# Patient Record
Sex: FEMALE | Race: WHITE | NOT HISPANIC OR LATINO | Employment: OTHER
[De-identification: names, ages, dates, MRNs, and addresses within clinical notes are randomized per-mention and may not be internally consistent; named-entity substitution may affect disease eponyms.]

---

## 2017-01-30 ENCOUNTER — RECORDS - HEALTHEAST (OUTPATIENT)
Dept: ADMINISTRATIVE | Facility: OTHER | Age: 62
End: 2017-01-30

## 2017-01-31 ENCOUNTER — RECORDS - HEALTHEAST (OUTPATIENT)
Dept: ADMINISTRATIVE | Facility: OTHER | Age: 62
End: 2017-01-31

## 2017-02-14 ENCOUNTER — RECORDS - HEALTHEAST (OUTPATIENT)
Dept: ADMINISTRATIVE | Facility: OTHER | Age: 62
End: 2017-02-14

## 2017-06-13 ENCOUNTER — COMMUNICATION - HEALTHEAST (OUTPATIENT)
Dept: SCHEDULING | Facility: CLINIC | Age: 62
End: 2017-06-13

## 2017-06-13 DIAGNOSIS — F34.1 DYSTHYMIC DISORDER: ICD-10-CM

## 2017-06-16 ENCOUNTER — COMMUNICATION - HEALTHEAST (OUTPATIENT)
Dept: FAMILY MEDICINE | Facility: CLINIC | Age: 62
End: 2017-06-16

## 2017-06-16 DIAGNOSIS — F34.1 DYSTHYMIC DISORDER: ICD-10-CM

## 2017-09-06 ENCOUNTER — COMMUNICATION - HEALTHEAST (OUTPATIENT)
Dept: SCHEDULING | Facility: CLINIC | Age: 62
End: 2017-09-06

## 2017-09-06 DIAGNOSIS — F34.1 DYSTHYMIC DISORDER: ICD-10-CM

## 2017-09-18 ENCOUNTER — OFFICE VISIT - HEALTHEAST (OUTPATIENT)
Dept: FAMILY MEDICINE | Facility: CLINIC | Age: 62
End: 2017-09-18

## 2017-09-18 DIAGNOSIS — A60.00 GENITAL HERPES SIMPLEX, UNSPECIFIED SITE: ICD-10-CM

## 2017-09-18 DIAGNOSIS — F34.1 DYSTHYMIC DISORDER: ICD-10-CM

## 2017-09-18 DIAGNOSIS — Z00.00 ROUTINE GENERAL MEDICAL EXAMINATION AT A HEALTH CARE FACILITY: ICD-10-CM

## 2017-09-18 DIAGNOSIS — M79.645 THUMB PAIN, LEFT: ICD-10-CM

## 2017-09-18 DIAGNOSIS — M89.9 DISORDER OF BONE AND CARTILAGE: ICD-10-CM

## 2017-09-18 DIAGNOSIS — E55.9 VITAMIN D DEFICIENCY: ICD-10-CM

## 2017-09-18 DIAGNOSIS — M25.561 KNEE PAIN, RIGHT: ICD-10-CM

## 2017-09-18 DIAGNOSIS — M94.9 DISORDER OF BONE AND CARTILAGE: ICD-10-CM

## 2017-09-18 DIAGNOSIS — A60.00 GENITAL HERPES, UNSPECIFIED: ICD-10-CM

## 2017-09-18 DIAGNOSIS — C44.90 MALIGNANT NEOPLASM OF SKIN: ICD-10-CM

## 2017-09-18 DIAGNOSIS — N95.2 POSTMENOPAUSAL ATROPHIC VAGINITIS: ICD-10-CM

## 2017-09-18 LAB
CHOLEST SERPL-MCNC: 249 MG/DL
FASTING STATUS PATIENT QL REPORTED: YES
HDLC SERPL-MCNC: 76 MG/DL
LDLC SERPL CALC-MCNC: 151 MG/DL
TRIGL SERPL-MCNC: 109 MG/DL

## 2017-09-18 ASSESSMENT — MIFFLIN-ST. JEOR: SCORE: 1231.02

## 2017-09-20 ENCOUNTER — COMMUNICATION - HEALTHEAST (OUTPATIENT)
Dept: FAMILY MEDICINE | Facility: CLINIC | Age: 62
End: 2017-09-20

## 2017-09-27 ENCOUNTER — AMBULATORY - HEALTHEAST (OUTPATIENT)
Dept: FAMILY MEDICINE | Facility: CLINIC | Age: 62
End: 2017-09-27

## 2017-09-27 DIAGNOSIS — E78.5 HYPERLIPIDEMIA, UNSPECIFIED HYPERLIPIDEMIA TYPE: ICD-10-CM

## 2017-09-28 ENCOUNTER — COMMUNICATION - HEALTHEAST (OUTPATIENT)
Dept: FAMILY MEDICINE | Facility: CLINIC | Age: 62
End: 2017-09-28

## 2017-09-28 ENCOUNTER — AMBULATORY - HEALTHEAST (OUTPATIENT)
Dept: NURSING | Facility: CLINIC | Age: 62
End: 2017-09-28

## 2017-09-28 ENCOUNTER — AMBULATORY - HEALTHEAST (OUTPATIENT)
Dept: FAMILY MEDICINE | Facility: CLINIC | Age: 62
End: 2017-09-28

## 2017-10-06 ENCOUNTER — OFFICE VISIT - HEALTHEAST (OUTPATIENT)
Dept: PHYSICAL THERAPY | Facility: REHABILITATION | Age: 62
End: 2017-10-06

## 2017-10-06 DIAGNOSIS — M25.561 ACUTE PAIN OF RIGHT KNEE: ICD-10-CM

## 2017-10-06 DIAGNOSIS — M62.81 GENERALIZED MUSCLE WEAKNESS: ICD-10-CM

## 2017-10-10 ENCOUNTER — OFFICE VISIT - HEALTHEAST (OUTPATIENT)
Dept: PHYSICAL THERAPY | Facility: REHABILITATION | Age: 62
End: 2017-10-10

## 2017-10-10 DIAGNOSIS — M62.81 GENERALIZED MUSCLE WEAKNESS: ICD-10-CM

## 2017-10-10 DIAGNOSIS — M25.561 ACUTE PAIN OF RIGHT KNEE: ICD-10-CM

## 2017-10-12 ENCOUNTER — OFFICE VISIT - HEALTHEAST (OUTPATIENT)
Dept: PHYSICAL THERAPY | Facility: REHABILITATION | Age: 62
End: 2017-10-12

## 2017-10-12 DIAGNOSIS — M62.81 GENERALIZED MUSCLE WEAKNESS: ICD-10-CM

## 2017-10-12 DIAGNOSIS — M25.561 ACUTE PAIN OF RIGHT KNEE: ICD-10-CM

## 2017-10-17 ENCOUNTER — OFFICE VISIT - HEALTHEAST (OUTPATIENT)
Dept: PHYSICAL THERAPY | Facility: REHABILITATION | Age: 62
End: 2017-10-17

## 2017-10-17 DIAGNOSIS — M25.561 ACUTE PAIN OF RIGHT KNEE: ICD-10-CM

## 2017-10-17 DIAGNOSIS — M62.81 GENERALIZED MUSCLE WEAKNESS: ICD-10-CM

## 2017-10-23 ENCOUNTER — OFFICE VISIT - HEALTHEAST (OUTPATIENT)
Dept: PHYSICAL THERAPY | Facility: REHABILITATION | Age: 62
End: 2017-10-23

## 2017-10-23 DIAGNOSIS — M62.81 GENERALIZED MUSCLE WEAKNESS: ICD-10-CM

## 2017-10-23 DIAGNOSIS — M25.561 ACUTE PAIN OF RIGHT KNEE: ICD-10-CM

## 2017-10-25 ENCOUNTER — OFFICE VISIT - HEALTHEAST (OUTPATIENT)
Dept: PHYSICAL THERAPY | Facility: REHABILITATION | Age: 62
End: 2017-10-25

## 2017-10-25 DIAGNOSIS — M25.561 ACUTE PAIN OF RIGHT KNEE: ICD-10-CM

## 2017-10-25 DIAGNOSIS — M62.81 GENERALIZED MUSCLE WEAKNESS: ICD-10-CM

## 2017-10-30 ENCOUNTER — COMMUNICATION - HEALTHEAST (OUTPATIENT)
Dept: FAMILY MEDICINE | Facility: CLINIC | Age: 62
End: 2017-10-30

## 2017-11-09 ENCOUNTER — OFFICE VISIT - HEALTHEAST (OUTPATIENT)
Dept: FAMILY MEDICINE | Facility: CLINIC | Age: 62
End: 2017-11-09

## 2017-11-09 DIAGNOSIS — M79.89 SWELLING OF LEFT THUMB: ICD-10-CM

## 2017-11-09 DIAGNOSIS — M25.461 SWELLING OF RIGHT KNEE JOINT: ICD-10-CM

## 2017-11-10 ENCOUNTER — HOSPITAL ENCOUNTER (OUTPATIENT)
Dept: MRI IMAGING | Facility: CLINIC | Age: 62
Discharge: HOME | End: 2017-11-10
Attending: FAMILY MEDICINE

## 2017-11-10 DIAGNOSIS — M25.461 SWELLING OF RIGHT KNEE JOINT: ICD-10-CM

## 2017-11-13 ENCOUNTER — COMMUNICATION - HEALTHEAST (OUTPATIENT)
Dept: FAMILY MEDICINE | Facility: CLINIC | Age: 62
End: 2017-11-13

## 2017-11-14 ENCOUNTER — RECORDS - HEALTHEAST (OUTPATIENT)
Dept: ADMINISTRATIVE | Facility: OTHER | Age: 62
End: 2017-11-14

## 2017-11-20 ENCOUNTER — HOSPITAL ENCOUNTER (OUTPATIENT)
Dept: NUTRITION | Facility: HOSPITAL | Age: 62
Discharge: HOME | End: 2017-11-20
Attending: FAMILY MEDICINE

## 2017-11-20 DIAGNOSIS — E78.5 HYPERLIPIDEMIA, UNSPECIFIED HYPERLIPIDEMIA TYPE: ICD-10-CM

## 2018-10-03 ENCOUNTER — COMMUNICATION - HEALTHEAST (OUTPATIENT)
Dept: FAMILY MEDICINE | Facility: CLINIC | Age: 63
End: 2018-10-03

## 2018-10-03 DIAGNOSIS — F34.1 DYSTHYMIC DISORDER: ICD-10-CM

## 2018-10-03 DIAGNOSIS — A60.00 GENITAL HERPES: ICD-10-CM

## 2018-10-10 ENCOUNTER — COMMUNICATION - HEALTHEAST (OUTPATIENT)
Dept: FAMILY MEDICINE | Facility: CLINIC | Age: 63
End: 2018-10-10

## 2018-10-10 DIAGNOSIS — B00.9 HERPES SIMPLEX TYPE II INFECTION: ICD-10-CM

## 2018-10-10 DIAGNOSIS — F34.1 DYSTHYMIC DISORDER: ICD-10-CM

## 2018-11-05 ENCOUNTER — OFFICE VISIT - HEALTHEAST (OUTPATIENT)
Dept: FAMILY MEDICINE | Facility: CLINIC | Age: 63
End: 2018-11-05

## 2018-11-05 DIAGNOSIS — F34.1 DYSTHYMIC DISORDER: ICD-10-CM

## 2018-11-05 DIAGNOSIS — B00.9 HERPES SIMPLEX TYPE II INFECTION: ICD-10-CM

## 2018-11-05 DIAGNOSIS — Z12.31 VISIT FOR SCREENING MAMMOGRAM: ICD-10-CM

## 2018-11-05 DIAGNOSIS — A60.00 GENITAL HERPES SIMPLEX, UNSPECIFIED SITE: ICD-10-CM

## 2018-12-21 ENCOUNTER — RECORDS - HEALTHEAST (OUTPATIENT)
Dept: MAMMOGRAPHY | Facility: CLINIC | Age: 63
End: 2018-12-21

## 2018-12-21 DIAGNOSIS — Z12.31 ENCOUNTER FOR SCREENING MAMMOGRAM FOR MALIGNANT NEOPLASM OF BREAST: ICD-10-CM

## 2019-10-03 ENCOUNTER — COMMUNICATION - HEALTHEAST (OUTPATIENT)
Dept: FAMILY MEDICINE | Facility: CLINIC | Age: 64
End: 2019-10-03

## 2019-10-14 ENCOUNTER — COMMUNICATION - HEALTHEAST (OUTPATIENT)
Dept: FAMILY MEDICINE | Facility: CLINIC | Age: 64
End: 2019-10-14

## 2019-10-14 DIAGNOSIS — B00.9 HERPES SIMPLEX TYPE II INFECTION: ICD-10-CM

## 2019-10-16 ENCOUNTER — COMMUNICATION - HEALTHEAST (OUTPATIENT)
Dept: FAMILY MEDICINE | Facility: CLINIC | Age: 64
End: 2019-10-16

## 2019-10-16 DIAGNOSIS — B00.9 HERPES SIMPLEX TYPE II INFECTION: ICD-10-CM

## 2019-10-17 ENCOUNTER — AMBULATORY - HEALTHEAST (OUTPATIENT)
Dept: FAMILY MEDICINE | Facility: CLINIC | Age: 64
End: 2019-10-17

## 2019-10-24 ENCOUNTER — COMMUNICATION - HEALTHEAST (OUTPATIENT)
Dept: FAMILY MEDICINE | Facility: CLINIC | Age: 64
End: 2019-10-24

## 2019-10-24 DIAGNOSIS — F34.1 DYSTHYMIC DISORDER: ICD-10-CM

## 2020-01-27 ENCOUNTER — COMMUNICATION - HEALTHEAST (OUTPATIENT)
Dept: FAMILY MEDICINE | Facility: CLINIC | Age: 65
End: 2020-01-27

## 2020-01-27 DIAGNOSIS — F34.1 DYSTHYMIC DISORDER: ICD-10-CM

## 2020-01-28 ENCOUNTER — HOSPITAL ENCOUNTER (OUTPATIENT)
Dept: MAMMOGRAPHY | Facility: CLINIC | Age: 65
Discharge: HOME | End: 2020-01-28
Attending: FAMILY MEDICINE

## 2020-01-28 DIAGNOSIS — Z12.31 VISIT FOR SCREENING MAMMOGRAM: ICD-10-CM

## 2020-01-29 ENCOUNTER — OFFICE VISIT - HEALTHEAST (OUTPATIENT)
Dept: FAMILY MEDICINE | Facility: CLINIC | Age: 65
End: 2020-01-29

## 2020-01-29 DIAGNOSIS — E55.9 VITAMIN D DEFICIENCY: ICD-10-CM

## 2020-01-29 DIAGNOSIS — F34.1 DYSTHYMIC DISORDER: ICD-10-CM

## 2020-01-29 DIAGNOSIS — Z13.220 LIPID SCREENING: ICD-10-CM

## 2020-01-29 DIAGNOSIS — E78.5 HYPERLIPIDEMIA, UNSPECIFIED HYPERLIPIDEMIA TYPE: ICD-10-CM

## 2020-01-29 DIAGNOSIS — M94.9 DISORDER OF BONE AND CARTILAGE: ICD-10-CM

## 2020-01-29 DIAGNOSIS — M89.9 DISORDER OF BONE AND CARTILAGE: ICD-10-CM

## 2020-01-29 LAB
ANION GAP SERPL CALCULATED.3IONS-SCNC: 11 MMOL/L (ref 5–18)
BUN SERPL-MCNC: 14 MG/DL (ref 8–22)
CALCIUM SERPL-MCNC: 10 MG/DL (ref 8.5–10.5)
CHLORIDE BLD-SCNC: 102 MMOL/L (ref 98–107)
CHOLEST SERPL-MCNC: 291 MG/DL
CO2 SERPL-SCNC: 27 MMOL/L (ref 22–31)
CREAT SERPL-MCNC: 0.82 MG/DL (ref 0.6–1.1)
FASTING STATUS PATIENT QL REPORTED: YES
GFR SERPL CREATININE-BSD FRML MDRD: >60 ML/MIN/1.73M2
GLUCOSE BLD-MCNC: 91 MG/DL (ref 70–125)
HDLC SERPL-MCNC: 89 MG/DL
LDLC SERPL CALC-MCNC: 179 MG/DL
POTASSIUM BLD-SCNC: 4.9 MMOL/L (ref 3.5–5)
SODIUM SERPL-SCNC: 140 MMOL/L (ref 136–145)
TRIGL SERPL-MCNC: 115 MG/DL

## 2020-01-29 ASSESSMENT — PATIENT HEALTH QUESTIONNAIRE - PHQ9: SUM OF ALL RESPONSES TO PHQ QUESTIONS 1-9: 3

## 2020-01-30 ENCOUNTER — COMMUNICATION - HEALTHEAST (OUTPATIENT)
Dept: FAMILY MEDICINE | Facility: CLINIC | Age: 65
End: 2020-01-30

## 2020-01-30 LAB — 25(OH)D3 SERPL-MCNC: 28.3 NG/ML (ref 30–80)

## 2020-02-18 ENCOUNTER — COMMUNICATION - HEALTHEAST (OUTPATIENT)
Dept: FAMILY MEDICINE | Facility: CLINIC | Age: 65
End: 2020-02-18

## 2020-07-15 ENCOUNTER — OFFICE VISIT - HEALTHEAST (OUTPATIENT)
Dept: FAMILY MEDICINE | Facility: CLINIC | Age: 65
End: 2020-07-15

## 2020-07-15 DIAGNOSIS — Z00.00 ROUTINE GENERAL MEDICAL EXAMINATION AT A HEALTH CARE FACILITY: ICD-10-CM

## 2020-07-15 DIAGNOSIS — M89.9 DISORDER OF BONE AND CARTILAGE: ICD-10-CM

## 2020-07-15 DIAGNOSIS — L29.9 ITCHING: ICD-10-CM

## 2020-07-15 DIAGNOSIS — A60.00 GENITAL HERPES SIMPLEX, UNSPECIFIED SITE: ICD-10-CM

## 2020-07-15 DIAGNOSIS — B00.9 HERPES SIMPLEX TYPE II INFECTION: ICD-10-CM

## 2020-07-15 DIAGNOSIS — E78.5 HYPERLIPIDEMIA, UNSPECIFIED HYPERLIPIDEMIA TYPE: ICD-10-CM

## 2020-07-15 DIAGNOSIS — D12.6 BENIGN NEOPLASM OF COLON, UNSPECIFIED PART OF COLON: ICD-10-CM

## 2020-07-15 DIAGNOSIS — E55.9 VITAMIN D DEFICIENCY: ICD-10-CM

## 2020-07-15 DIAGNOSIS — F41.1 ANXIETY STATE: ICD-10-CM

## 2020-07-15 DIAGNOSIS — C44.90 MALIGNANT NEOPLASM OF SKIN: ICD-10-CM

## 2020-07-15 DIAGNOSIS — F34.1 DYSTHYMIC DISORDER: ICD-10-CM

## 2020-07-15 DIAGNOSIS — M94.9 DISORDER OF BONE AND CARTILAGE: ICD-10-CM

## 2020-07-15 DIAGNOSIS — Z78.0 MENOPAUSE PRESENT: ICD-10-CM

## 2020-07-15 LAB
ALBUMIN SERPL-MCNC: 4 G/DL (ref 3.5–5)
ALP SERPL-CCNC: 85 U/L (ref 45–120)
ALT SERPL W P-5'-P-CCNC: 14 U/L (ref 0–45)
ANION GAP SERPL CALCULATED.3IONS-SCNC: 8 MMOL/L (ref 5–18)
AST SERPL W P-5'-P-CCNC: 19 U/L (ref 0–40)
BILIRUB SERPL-MCNC: 0.3 MG/DL (ref 0–1)
BUN SERPL-MCNC: 15 MG/DL (ref 8–22)
CALCIUM SERPL-MCNC: 10 MG/DL (ref 8.5–10.5)
CHLORIDE BLD-SCNC: 105 MMOL/L (ref 98–107)
CO2 SERPL-SCNC: 27 MMOL/L (ref 22–31)
CREAT SERPL-MCNC: 0.8 MG/DL (ref 0.6–1.1)
GFR SERPL CREATININE-BSD FRML MDRD: >60 ML/MIN/1.73M2
GLUCOSE BLD-MCNC: 93 MG/DL (ref 70–125)
POTASSIUM BLD-SCNC: 4.9 MMOL/L (ref 3.5–5)
PROT SERPL-MCNC: 7 G/DL (ref 6–8)
SODIUM SERPL-SCNC: 140 MMOL/L (ref 136–145)
TSH SERPL DL<=0.005 MIU/L-ACNC: 1.51 UIU/ML (ref 0.3–5)

## 2020-07-15 RX ORDER — VALACYCLOVIR HYDROCHLORIDE 1 G/1
1000 TABLET, FILM COATED ORAL 2 TIMES DAILY
Qty: 12 TABLET | Refills: 5 | Status: SHIPPED | OUTPATIENT
Start: 2020-07-15 | End: 2021-09-22

## 2020-07-15 RX ORDER — CITALOPRAM HYDROBROMIDE 20 MG/1
20 TABLET ORAL DAILY
Qty: 90 TABLET | Refills: 3 | Status: SHIPPED | OUTPATIENT
Start: 2020-07-15 | End: 2021-08-04

## 2020-07-15 ASSESSMENT — PATIENT HEALTH QUESTIONNAIRE - PHQ9: SUM OF ALL RESPONSES TO PHQ QUESTIONS 1-9: 0

## 2020-07-15 ASSESSMENT — MIFFLIN-ST. JEOR: SCORE: 1223.76

## 2020-07-16 ENCOUNTER — COMMUNICATION - HEALTHEAST (OUTPATIENT)
Dept: FAMILY MEDICINE | Facility: CLINIC | Age: 65
End: 2020-07-16

## 2020-07-16 ENCOUNTER — RECORDS - HEALTHEAST (OUTPATIENT)
Dept: ADMINISTRATIVE | Facility: OTHER | Age: 65
End: 2020-07-16

## 2020-07-16 LAB — HCV AB SERPL QL IA: NEGATIVE

## 2020-08-11 ENCOUNTER — RECORDS - HEALTHEAST (OUTPATIENT)
Dept: ADMINISTRATIVE | Facility: OTHER | Age: 65
End: 2020-08-11

## 2020-08-11 ENCOUNTER — RECORDS - HEALTHEAST (OUTPATIENT)
Dept: BONE DENSITY | Facility: CLINIC | Age: 65
End: 2020-08-11

## 2020-08-11 DIAGNOSIS — Z78.0 ASYMPTOMATIC MENOPAUSAL STATE: ICD-10-CM

## 2020-08-11 DIAGNOSIS — M89.9 DISORDER OF BONE, UNSPECIFIED: ICD-10-CM

## 2020-08-11 DIAGNOSIS — E55.9 VITAMIN D DEFICIENCY, UNSPECIFIED: ICD-10-CM

## 2020-08-11 DIAGNOSIS — M94.9 DISORDER OF CARTILAGE, UNSPECIFIED: ICD-10-CM

## 2020-08-12 ENCOUNTER — COMMUNICATION - HEALTHEAST (OUTPATIENT)
Dept: FAMILY MEDICINE | Facility: CLINIC | Age: 65
End: 2020-08-12

## 2021-05-26 ENCOUNTER — RECORDS - HEALTHEAST (OUTPATIENT)
Dept: ADMINISTRATIVE | Facility: CLINIC | Age: 66
End: 2021-05-26

## 2021-05-26 ASSESSMENT — PATIENT HEALTH QUESTIONNAIRE - PHQ9: SUM OF ALL RESPONSES TO PHQ QUESTIONS 1-9: 3

## 2021-05-27 ASSESSMENT — PATIENT HEALTH QUESTIONNAIRE - PHQ9: SUM OF ALL RESPONSES TO PHQ QUESTIONS 1-9: 0

## 2021-05-29 ENCOUNTER — RECORDS - HEALTHEAST (OUTPATIENT)
Dept: ADMINISTRATIVE | Facility: CLINIC | Age: 66
End: 2021-05-29

## 2021-05-31 VITALS — BODY MASS INDEX: 29.03 KG/M2 | WEIGHT: 161.3 LBS

## 2021-05-31 VITALS — WEIGHT: 162 LBS | BODY MASS INDEX: 28.7 KG/M2 | HEIGHT: 63 IN

## 2021-06-02 VITALS — BODY MASS INDEX: 27.74 KG/M2 | WEIGHT: 154.13 LBS

## 2021-06-02 NOTE — TELEPHONE ENCOUNTER
RN cannot approve Refill Request    RN can NOT refill this medication Protocol failed and NO refill given.       Viviana Aragon, [facility] Triage/Med Refill 10/16/2019    Requested Prescriptions   Pending Prescriptions Disp Refills     valACYclovir (VALTREX) 1000 MG tablet [Pharmacy Med Name: VALACYCLOVIR 1GM TABLETS] 12 tablet 0     Sig: TAKE 1 TABLET(1000 MG) BY MOUTH TWICE DAILY FOR 3 DAYS       Antivirals Refill Protocol Failed - 10/14/2019 11:56 PM        Failed - Renal function done in last year     Creatinine   Date Value Ref Range Status   09/18/2017 0.78 0.60 - 1.10 mg/dL Final             Passed - Visit with PCP or prescribing provider visit in past 12 months or next 3 months     Last office visit with prescriber/PCP: 11/5/2018 Tawana Spear MD OR same dept: 11/5/2018 Tawana Spear MD OR same specialty: 11/5/2018 Tawana Spear MD  Last physical: Visit date not found Last MTM visit: Visit date not found   Next visit within 3 mo: Visit date not found  Next physical within 3 mo: Visit date not found  Prescriber OR PCP: Tawana Spear MD  Last diagnosis associated with med order: 1. Herpes simplex type II infection  - valACYclovir (VALTREX) 1000 MG tablet [Pharmacy Med Name: VALACYCLOVIR 1GM TABLETS]; TAKE 1 TABLET(1000 MG) BY MOUTH TWICE DAILY FOR 3 DAYS  Dispense: 12 tablet; Refill: 0    If protocol passes may refill for 12 months if within 3 months of last provider visit (or a total of 15 months).

## 2021-06-02 NOTE — TELEPHONE ENCOUNTER
RN cannot approve Refill Request    RN can NOT refill this medication Protocol failed and NO refill given.       Viviana Aragon, [facility] Triage/Med Refill 10/17/2019    Requested Prescriptions   Pending Prescriptions Disp Refills     valACYclovir (VALTREX) 1000 MG tablet 12 tablet 5     Sig: Take 1 tablet (1,000 mg total) by mouth 2 (two) times a day for 3 days.       Antivirals Refill Protocol Failed - 10/16/2019  8:14 AM        Failed - Renal function done in last year     Creatinine   Date Value Ref Range Status   09/18/2017 0.78 0.60 - 1.10 mg/dL Final             Passed - Visit with PCP or prescribing provider visit in past 12 months or next 3 months     Last office visit with prescriber/PCP: 11/5/2018 Tawana Spear MD OR same dept: 11/5/2018 Tawana Spear MD OR same specialty: 11/5/2018 Tawana Spear MD  Last physical: Visit date not found Last MTM visit: Visit date not found   Next visit within 3 mo: Visit date not found  Next physical within 3 mo: Visit date not found  Prescriber OR PCP: Tawana Spear MD  Last diagnosis associated with med order: 1. Herpes simplex type II infection  - valACYclovir (VALTREX) 1000 MG tablet; Take 1 tablet (1,000 mg total) by mouth 2 (two) times a day for 3 days.  Dispense: 12 tablet; Refill: 5    If protocol passes may refill for 12 months if within 3 months of last provider visit (or a total of 15 months).

## 2021-06-02 NOTE — TELEPHONE ENCOUNTER
Refill Approved    Rx renewed per Medication Renewal Policy. Medication was last renewed on 11/5/18.    Viviana Aragon, [facility] Triage/Med Refill 10/24/2019     Requested Prescriptions   Pending Prescriptions Disp Refills     citalopram (CELEXA) 20 MG tablet [Pharmacy Med Name: CITALOPRAM 20MG TABLETS] 90 tablet 0     Sig: TAKE 1 TABLET(20 MG) BY MOUTH DAILY       [facility] Refill Protocol  Passed - 10/24/2019  3:52 AM        Passed - PCP or prescribing provider visit in last year     Last office visit with prescriber/PCP: 11/5/2018 Tawana Spear MD OR same dept: 11/5/2018 Tawana Spear MD OR same specialty: 11/5/2018 Tawana Spear MD  Last physical: Visit date not found Last MTM visit: Visit date not found   Next visit within 3 mo: Visit date not found  Next physical within 3 mo: Visit date not found  Prescriber OR PCP: Tawana Spear MD  Last diagnosis associated with med order: 1. Dysthymic disorder  - citalopram (CELEXA) 20 MG tablet [Pharmacy Med Name: CITALOPRAM 20MG TABLETS]; TAKE 1 TABLET(20 MG) BY MOUTH DAILY  Dispense: 90 tablet; Refill: 0    If protocol passes may refill for 12 months if within 3 months of last provider visit (or a total of 15 months).

## 2021-06-04 VITALS
HEIGHT: 63 IN | DIASTOLIC BLOOD PRESSURE: 70 MMHG | HEART RATE: 74 BPM | BODY MASS INDEX: 28.42 KG/M2 | WEIGHT: 160.4 LBS | SYSTOLIC BLOOD PRESSURE: 120 MMHG

## 2021-06-04 VITALS
BODY MASS INDEX: 29.73 KG/M2 | HEART RATE: 77 BPM | SYSTOLIC BLOOD PRESSURE: 102 MMHG | DIASTOLIC BLOOD PRESSURE: 60 MMHG | OXYGEN SATURATION: 95 % | WEIGHT: 165.19 LBS

## 2021-06-05 NOTE — TELEPHONE ENCOUNTER
Patient Returning Call  Reason for call:  Return call.  Information relayed to patient:  Patient was informed that she is due for an appointment prior to a refill approval.  Patient has additional questions:  No  If YES, what are your questions/concerns:  n/a  Okay to leave a detailed message?: No call back needed    Patient was transferred to scheduling to make an appointment.

## 2021-06-05 NOTE — TELEPHONE ENCOUNTER
RN cannot approve Refill Request    RN can NOT refill this medication Protocol failed and NO refill given.       Viviana Aragon, [facility] Triage/Med Refill 1/27/2020    Requested Prescriptions   Pending Prescriptions Disp Refills     citalopram (CELEXA) 20 MG tablet [Pharmacy Med Name: CITALOPRAM 20MG TABLETS] 90 tablet 3     Sig: TAKE 1 TABLET(20 MG) BY MOUTH DAILY       [facility] Refill Protocol  Failed - 1/27/2020  1:30 PM        Failed - PCP or prescribing provider visit in last year     Last office visit with prescriber/PCP: 11/5/2018 Tawana Spear MD OR same dept: Visit date not found OR same specialty: 11/5/2018 Tawana Spear MD  Last physical: Visit date not found Last MTM visit: Visit date not found   Next visit within 3 mo: Visit date not found  Next physical within 3 mo: Visit date not found  Prescriber OR PCP: Tawana Spear MD  Last diagnosis associated with med order: 1. Dysthymic disorder  - citalopram (CELEXA) 20 MG tablet [Pharmacy Med Name: CITALOPRAM 20MG TABLETS]; TAKE 1 TABLET(20 MG) BY MOUTH DAILY  Dispense: 90 tablet; Refill: 0    If protocol passes may refill for 12 months if within 3 months of last provider visit (or a total of 15 months).

## 2021-06-05 NOTE — TELEPHONE ENCOUNTER
RN cannot approve Refill Request    RN can NOT refill this medication Protocol failed and NO refill given.       Viviana Aragon, [facility] Triage/Med Refill 1/27/2020    Requested Prescriptions   Pending Prescriptions Disp Refills     citalopram (CELEXA) 20 MG tablet 90 tablet 0     Sig: Take 1 tablet (20 mg total) by mouth daily.       [facility] Refill Protocol  Failed - 1/27/2020  5:06 PM        Failed - PCP or prescribing provider visit in last year     Last office visit with prescriber/PCP: 11/5/2018 Tawana Spear MD OR same dept: Visit date not found OR same specialty: 11/5/2018 Tawana Spear MD  Last physical: Visit date not found Last MTM visit: Visit date not found   Next visit within 3 mo: Visit date not found  Next physical within 3 mo: Visit date not found  Prescriber OR PCP: Tawana Spear MD  Last diagnosis associated with med order: 1. Dysthymic disorder  - citalopram (CELEXA) 20 MG tablet; Take 1 tablet (20 mg total) by mouth daily.  Dispense: 90 tablet; Refill: 0    If protocol passes may refill for 12 months if within 3 months of last provider visit (or a total of 15 months).

## 2021-06-05 NOTE — TELEPHONE ENCOUNTER
Left message for patient to call back. If patient calls back, please relay message below.    Patient needs a physical appointment to have medications refilled. Please assist in scheduling, may be able to bridge prescription until appointment.

## 2021-06-05 NOTE — PROGRESS NOTES
Chief complaint: Med check    HPI: This been over a year since the patient has been seen at all.  She missed a couple of appointments.  She wants to continue her citalopram but wonders if she even needs it.  She is on 20 mg a day and I have suggested that she alternate 20 mg with 10 mg until I see her in about 6 weeks for her health maintenance visit.  She was agreeable to that.    She happens to be fasting today and inquired as to whether or not she could have labs completed today.    I reviewed her chart and we created a plan for fasting blood work that can be done today    Objective:/60   Pulse 77   Wt 165 lb 3 oz (74.9 kg)   SpO2 95%   BMI 29.73 kg/m    PHQ 9 score is 3    Assessment: Medication management for dysthymia  Fasting labs drawn in anticipation of her complete physical which is scheduled for next month  She has had skin cancer and sees the dermatologist regularly.    Plan: We will have her alternate 20 mg with 10 mg of citalopram for the next 6 weeks and reevaluate in 6 weeks.  We will do fasting labs today and she will be notified of those results and we will review everything when I see her in 6 weeks.

## 2021-06-09 ENCOUNTER — HOSPITAL ENCOUNTER (OUTPATIENT)
Dept: MAMMOGRAPHY | Facility: CLINIC | Age: 66
Discharge: HOME | End: 2021-06-09
Attending: FAMILY MEDICINE

## 2021-06-09 DIAGNOSIS — Z12.31 VISIT FOR SCREENING MAMMOGRAM: ICD-10-CM

## 2021-06-09 NOTE — PROGRESS NOTES
Assessment and Plan:   Annual wellness    1. Routine general medical examination at a health care facility       2. Vitamin D deficiency     - DXA Bone Density Scan; Future    3. Hyperlipidemia, unspecified hyperlipidemia type       4. Basal Cell Carcinoma Of The Skin   sees Dermatologist yearly      5. Osteopenia     - DXA Bone Density Scan; Future    6. Anxiety  Doing well    7. Dysthymic disorder  Tried to decrease in January and that did not go well, so will stay at this dose  - citalopram (CELEXA) 20 MG tablet; Take 1 tablet (20 mg total) by mouth daily.  Dispense: 90 tablet; Refill: 3    8. Benign neoplasm of colon, unspecified part of colon  Colonoscopy scheduled    9. Genital herpes simplex, unspecified site       10. Herpes simplex type II infection     - valACYclovir (VALTREX) 1000 MG tablet; Take 1 tablet (1,000 mg total) by mouth 2 (two) times a day for 3 days.  Dispense: 12 tablet; Refill: 5    11. Itching  Wrists and scalp  - Comprehensive Metabolic Panel  - Thyroid Stimulating Hormone (TSH)    12. Menopause present     - DXA Bone Density Scan; Future        The patient's current medical problems were reviewed.    I have had an Advance Directives discussion with the patient.  The following health maintenance schedule was reviewed with the patient and provided in printed form in the after visit summary:   Health Maintenance   Topic Date Due     DEPRESSION ACTION PLAN  1955     HEPATITIS C SCREENING  1955     ZOSTER VACCINES (2 of 3) 11/23/2017     MEDICARE ANNUAL WELLNESS VISIT  11/25/2018     PNEUMOCOCCAL IMMUNIZATION 65+ LOW/MEDIUM RISK (1 of 2 - PCV13) 11/25/2018     DXA SCAN  11/25/2018     FALL RISK ASSESSMENT  11/25/2018     INFLUENZA VACCINE RULE BASED (1) 08/01/2020     MAMMOGRAM  01/28/2022     ADVANCE CARE PLANNING  09/18/2022     LIPID  01/29/2025     COLORECTAL CANCER SCREENING  04/01/2025     TD 18+ HE  09/18/2027     HEPATITIS B VACCINES  Aged Out        Subjective:   Chief  Complaint: David Nugent is an 64 y.o. female here for an Annual Wellness visit.   HPI: She has a multitude of questions today most of which I would have covered under her health care maintenance anyway.    She wonders about doing a bone density and she is overdue for that.  The last time she had a bone density she had osteopenia and she was supposed to recheck in 2 years and she has not done that.    She is due for pneumonia vaccine because she missed her visit last year    She wonders why she had a thyroid tested many many years ago and I am not sure but since she is having some itchy skin now on her wrists and scalp, I will check that.  She is also due to see her dermatologist because of her history of skin cancer.    She is due to have a colonoscopy for a 5-year follow-up so that is scheduled.    We talked about the Shingrix vaccine and she is get a hold off probably until next year or after the COVID pandemic has settled down.    She asked if she never had estrogen levels done and I told her that there usually is not any reason for that and we typically do not repeat hormone levels and we typically do not treat postmenopausal hormone levels.    We will give her a pneumonia vaccine today.  We talked about doing a bone density because she is overdue for that.    Review of Systems:     Please see above.  The rest of the review of systems are negative for all systems.    Patient Care Team:  Tawana Spear MD as PCP - General (Family Medicine)  Tawana Spear MD as Assigned PCP     Patient Active Problem List   Diagnosis     Basal Cell Carcinoma Of The Skin     Microscopic Hematuria     Osteopenia     Anxiety     Dysthymic Disorder     Vitamin D Deficiency     Postmenopausal Atrophic Vaginitis     Herpes Simplex Type II     Benign Polyps Of The Large Intestine     Hyperlipidemia     Past Medical History:   Diagnosis Date     Ankle fracture 2013    left     Anxiety      Foot fracture 2008     Knee  fracture 2009     Pelvis fracture (H) 2008     Rib fracture       Past Surgical History:   Procedure Laterality Date     SC DILATION/CURETTAGE,DIAGNOSTIC      Description: Dilation And Curettage;  Proc Date: 2005;     WISDOM TOOTH EXTRACTION        Family History   Problem Relation Age of Onset     Heart disease Father      Diabetes Father      Skin cancer Sister      Multiple sclerosis Brother      Cancer Maternal Grandmother      Kidney disease Brother      Hypertension Brother      Cancer Brother         bladder and kidney     Osteoporosis Mother      Skin cancer Mother      Breast cancer Paternal Grandmother      Breast cancer Cousin      Breast cancer Cousin       Social History     Socioeconomic History     Marital status:      Spouse name: Not on file     Number of children: 0     Years of education: Not on file     Highest education level: Not on file   Occupational History     Occupation: Desk job     Employer: [employer]     Comment: RETIRED   Social Needs     Financial resource strain: Not on file     Food insecurity     Worry: Not on file     Inability: Not on file     Transportation needs     Medical: Not on file     Non-medical: Not on file   Tobacco Use     Smoking status: Former Smoker     Last attempt to quit: 1976     Years since quittin.5     Smokeless tobacco: Never Used   Substance and Sexual Activity     Alcohol use: Yes     Alcohol/week: 4.0 - 6.0 standard drinks     Types: 3 - 5 Shots of liquor, 1 Standard drinks or equivalent per week     Drug use: No     Sexual activity: Yes     Partners: Male     Birth control/protection: None, Post-menopausal   Lifestyle     Physical activity     Days per week: Not on file     Minutes per session: Not on file     Stress: Not on file   Relationships     Social connections     Talks on phone: Not on file     Gets together: Not on file     Attends Holiness service: Not on file     Active member of club or organization: Not on  "file     Attends meetings of clubs or organizations: Not on file     Relationship status: Not on file     Intimate partner violence     Fear of current or ex partner: Not on file     Emotionally abused: Not on file     Physically abused: Not on file     Forced sexual activity: Not on file   Other Topics Concern     Not on file   Social History Narrative    Quaker      Current Outpatient Medications   Medication Sig Dispense Refill     citalopram (CELEXA) 20 MG tablet Take 1 tablet (20 mg total) by mouth daily. 90 tablet 3     valACYclovir (VALTREX) 1000 MG tablet Take 1 tablet (1,000 mg total) by mouth 2 (two) times a day for 3 days. 12 tablet 5     No current facility-administered medications for this visit.       Objective:   Vital Signs:   Visit Vitals  /70 (Patient Site: Right Arm, Patient Position: Sitting, Cuff Size: Adult Regular)   Pulse 74   Ht 5' 2.5\" (1.588 m)   Wt 160 lb 6.4 oz (72.8 kg)   BMI 28.87 kg/m           VisionScreening:  No exam data present glasses; sees ophthalmologist    PHYSICAL EXAM  General Appearance: Alert, cooperative, no distress, appears stated age  Head: Normocephalic, without obvious abnormality, atraumatic  Eyes: PERRL, conjunctiva/corneas clear, EOM's intact. Glasses   Ears: Normal TM's and external ear canals, both ears  Nose: Nares normal, septum midline,mucosa normal, no drainage  Throat: Lips, mucosa, and tongue normal; teeth and gums normal  Neck: Supple, symmetrical, trachea midline, no adenopathy;  thyroid: not enlarged, symmetric, no tenderness/mass/nodules;    Back: Symmetric, no curvature, ROM normal, no CVA tenderness  Lungs: Clear to auscultation bilaterally, respirations unlabored  Breasts:  Patient deferred  Heart: Regular rate and rhythm, S1 and S2 normal, no murmur, rub, or gallop, Abdomen: Soft, non-tender, bowel sounds active all four quadrants,  no masses, no organomegaly  Pelvic:Not examined  Extremities: Extremities normal, atraumatic, no cyanosis or " edema  Skin: Skin color, texture, turgor normal, no rashes or lesions  Lymph nodes: Cervical, supraclavicular, and axillary nodes normal  Neurologic: Normal     Assessment Results 7/15/2020   Activities of Daily Living No help needed   Instrumental Activities of Daily Living No help needed   Get Up and Go Score Less than 12 seconds   Mini Cog Total Score 3   Some recent data might be hidden     A Mini-Cog score of 0-2 suggests the possibility of dementia, score of 3-5 suggests no dementia  No Falls risk  Clock face normal; 3 word recall only 1 word    Identified Health Risks:     She is at risk for lack of exercise and has been provided with information to increase physical activity for the benefit of her well-being.  The patient was counseled and encouraged to consider modifying their diet and eating habits. She was provided with information on recommended healthy diet options.
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
## 2021-06-13 NOTE — PROGRESS NOTES
[facility] Daily Progress     Patient Name: David Nugent  Date: 10/23/2017  Visit #: 5  Referral Diagnosis: Knee pain, right  Referring provider: Tawana Spear MD  Visit Diagnosis:     ICD-10-CM    1. Acute pain of right knee M25.561    2. Generalized muscle weakness M62.81          Assessment:     Patient is benefitting from skilled physical therapy and is making steady progress toward functional goals.  Patient is appropriate to continue with skilled physical therapy intervention, as indicated by initial plan of care.  KT did not stay on knee after last visit so wasn't sure it helped.  Did not notice much relief with the swelling with the MFR but pt will monitor the rest of the day.    Goal Status:  Pt. will be independent with home exercise program in : 6 weeks  Pt will: Able to kneel or squat to get objects out of lower cupboards within 4-6 weeks  Pt will: Able to ambulate up and down stairs with a step through pattern at a normal pace within 4-6 weeks  Pt will: Able to walk with a normal stride for 15-30 minutes within 4-6 weeks     Continues to make progress toward goals    Plan / Patient Education:       MFR 1-2 more visits  Add more strengthening as able  Pt to consider scheduling with an orthopedist    Subjective:     Pain Ratin    I feel about the same.  Not sure if the KT helped as it rolled with my pants and fell off.  It is a little stiff when I get up but some of the time I forget about it.  The swelling is still there and it is painful if I bend my knee too far    Objective:     Functional Limitations: kneeling, squat, walking a normal pace, stairs    Exercises:  Exercise #1: sitting hamstring, standing gastroc and standing hip flexor  Comment #1: 30 seconds X 1-3 reps  Exercise #2: quad set  Comment #2: hold 5-10 seconds X 5-10 reps  Exercise #3: SLR with quad set  Comment #3: 20 reps  Exercise #4: sidelying hip abd with quad set  Comment #4: 20  Exercise #5: sitting hip  adduction isometric with pillow between knees;  working up to 100% effort  Comment #5: 10 seconds X 10 reps  Exercise #6: prone hip ext  Comment #6: 20 reps  Exercise #7: clamshells  Comment #7: 20       Treatment Today     TREATMENT MINUTES COMMENTS   Evaluation     Self-care/ Home management     Manual therapy 15 Pt supine with bolster under knees;  MFR right knee, distal toward proximal for swelling   Neuromuscular Re-education     Therapeutic Activity     Therapeutic Exercises 10 See above or flow sheet   Gait training     Modality__________________     Electrical stimulation           Total 25    Blank areas are intentional and mean the treatment did not include these items.       Kenna Simms, PT  10/23/2017

## 2021-06-13 NOTE — PROGRESS NOTES
, this patient has appointment today for a nurse visit only at [facility] and is  requesting for Shingles. Please advise/ review and enter appropriate order for patient. Thank you      Chikis García [facility] 9/28/2017 8:14 AM
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
## 2021-06-13 NOTE — PROGRESS NOTES
[facility] Daily Progress     Patient Name: David Nugent  Date: 10/17/2017  Visit #: 4  Referral Diagnosis: Knee pain, right  Referring provider: Tawana Spear MD  Visit Diagnosis:     ICD-10-CM    1. Acute pain of right knee M25.561    2. Generalized muscle weakness M62.81          Assessment:     Patient is benefitting from skilled physical therapy and is making steady progress toward functional goals.  Patient is appropriate to continue with skilled physical therapy intervention, as indicated by initial plan of care.  Patient is starting to get frustrated as the swelling continues to come and go.  She wasn't sure if the tape or the e-stim was helpful so tried both one more time.  No pain with the exercises.    Goal Status:  Pt. will be independent with home exercise program in : 6 weeks  Pt will: Able to kneel or squat to get objects out of lower cupboards within 4-6 weeks  Pt will: Able to ambulate up and down stairs with a step through pattern at a normal pace within 4-6 weeks  Pt will: Able to walk with a normal stride for 15-30 minutes within 4-6 weeks    Plan / Patient Education:       KT prn  E-stim -determine effectiveness  Strengthening as able    Subjective:     Pain Ratin    Sometimes I feel the swelling is gone, and sometimes it feels good  Today it has a full feeling  Not sure I noticed much change with the KT      Objective:     Functional Limitations: kneeling, squat, walking a normal pace, stairs    Exercises:  Exercise #1: sitting hamstring, standing gastroc and standing hip flexor  Comment #1: 30 seconds X 1-3 reps  Exercise #2: quad set  Comment #2: hold 5-10 seconds X 5-10 reps  Exercise #3: SLR with quad set  Comment #3: 20 reps  Exercise #4: sidelying hip abd with quad set  Comment #4: 20  Exercise #5: sitting hip adduction isometric with pillow between knees;  working up to 100% effort  Comment #5: 10 seconds X 10 reps  Exercise #6: prone hip ext  Comment #6: 20  reps       Treatment Today     TREATMENT MINUTES COMMENTS   Evaluation     Self-care/ Home management     Manual therapy     Neuromuscular Re-education 10 KT right knee Y strip, distal toward proximal, paper off tension  Y strip, paper off tension at lateral edge of patella with ends surrounding patella   Therapeutic Activity     Therapeutic Exercises 5 See above or flow sheet   Gait training     Modality__________________     Electrical stimulation 15 Pt supine with wedge under knees;  4 pads right knee, IFC sweep  With ice         Total 30    Blank areas are intentional and mean the treatment did not include these items.       Kenna Simms, PT  10/17/2017

## 2021-06-13 NOTE — PROGRESS NOTES
Chief Complaint   Patient presents with     Shingles     ABN was given/ signed and sent to medical records to scan   Pt states not taking valACYclovir (VALTREX) 500 MG tablet but will when needed. Pt would like to know after shingles shot how long she can get back on it? It is okay to send out a message thru sharon García, [facility] 9/28/2017 2:11 PM

## 2021-06-13 NOTE — PROGRESS NOTES
[facility] Daily Progress     Patient Name: David Nugent  Date: 10/10/2017  Visit #: 2  Referral Diagnosis: Knee pain, right  Referring provider: Tawana Spear MD  Visit Diagnosis:     ICD-10-CM    1. Acute pain of right knee M25.561    2. Generalized muscle weakness M62.81          Assessment:     Patient is benefitting from skilled physical therapy and is making steady progress toward functional goals.  Patient is appropriate to continue with skilled physical therapy intervention, as indicated by initial plan of care.    Goal Status:  Pt. will be independent with home exercise program in : 6 weeks  Pt will: Able to kneel or squat to get objects out of lower cupboards within 4-6 weeks  Pt will: Able to ambulate up and down stairs with a step through pattern at a normal pace within 4-6 weeks  Pt will: Able to walk with a normal stride for 15-30 minutes within 4-6 weeks    Plan / Patient Education:     Review stretches  KT prn  E-stim if helpful with ice  Strengthening as able    Subjective:     Pain Ratin    Knee is more stiff today.  Unsure if it is the cold weather or that I sat a lot yesterday.      Objective:     Functional Limitations: kneeling, squat, walking a normal pace, stairs    Exercises:  Exercise #1: sitting hamstring, standing gastroc and standing hip flexor  Comment #1: 30 seconds X 1-3 reps  Exercise #2: quad set  Comment #2: hold 5-10 seconds X 5-10 reps       Treatment Today     TREATMENT MINUTES COMMENTS   Evaluation     Self-care/ Home management     Manual therapy     Neuromuscular Re-education     Therapeutic Activity     Therapeutic Exercises 10 See above or flow sheet   Gait training     Modality__________________     Electrical stimulation 15 Pt supine with wedge under knees;  4 pads right knee, IFC sweep  With ice         Total 25    Blank areas are intentional and mean the treatment did not include these items.       Kenna Simms, PT  10/10/2017

## 2021-06-13 NOTE — PROGRESS NOTES
[facility] Daily Progress     [facility] Discharge Summary  Patient Name: David Nugent  Date: 11/22/2017  Referral Diagnosis: Right Knee Pain  Referring provider: Tawana Spear MD  Visit Diagnosis:   1. Acute pain of right knee     2. Generalized muscle weakness         Goals:  Pt. will be independent with home exercise program in : 6 weeks  Pt will: Able to kneel or squat to get objects out of lower cupboards within 4-6 weeks  Pt will: Able to ambulate up and down stairs with a step through pattern at a normal pace within 4-6 weeks  Pt will: Able to walk with a normal stride for 15-30 minutes within 4-6 weeks    Patient was seen for 6 visits from 10/6/17 to 10/25/17 with 2 missed appointments.  Patient continued to have knee problmes with pain and swelling and physical therapy did not seem to be helping.  Suggested she schedule with an opthopedist.  Goals were not met.    Therapy will be discontinued at this time.  The patient will need a new referral to resume.    Thank you for your referral.  Kenna Simms  11/22/2017  11:10 AM    Patient Name: David Nugent  Date: 10/25/2017  Visit #: 6  Referral Diagnosis: Knee pain, right  Referring provider: Tawana Spear MD  Visit Diagnosis:     ICD-10-CM    1. Acute pain of right knee M25.561    2. Generalized muscle weakness M62.81          Assessment:     Patient is benefitting from skilled physical therapy and is making steady progress toward functional goals.  Patient is appropriate to continue with skilled physical therapy intervention, as indicated by initial plan of care.  Patient's right knee was warm to the touch and moderate amount of swelling.  She was able to bend her knee to put her shoe on after the MFR, but much of the swelling remained.  Will see pt one more visit or if she sees the orthopedist before that, what ever new orders there may be.  Goal Status:  Pt. will be independent with home exercise program in  : 6 weeks  Pt will: Able to kneel or squat to get objects out of lower cupboards within 4-6 weeks  Pt will: Able to ambulate up and down stairs with a step through pattern at a normal pace within 4-6 weeks  Pt will: Able to walk with a normal stride for 15-30 minutes within 4-6 weeks     Continues to make progress toward goals    Plan / Patient Education:       MFR 1 more visits  Add more strengthening as able  Pt to consider scheduling with an orthopedist    Subjective:     Pain Ratin    I noticed on Tuesday my knee did not have much swelling, but I didn't do much.  Today, as I was more active, the swelling is back.    Has not called the orthopedist yet.  Will do that today    Objective:     Functional Limitations: kneeling, squat, walking a normal pace, stairs    Exercises:  Exercise #1: sitting hamstring, standing gastroc and standing hip flexor  Comment #1: 30 seconds X 1-3 reps  Exercise #2: quad set  Comment #2: hold 5-10 seconds X 5-10 reps  Exercise #3: SLR with quad set  Comment #3: 20 reps  Exercise #4: sidelying hip abd with quad set  Comment #4: 20  Exercise #5: sitting hip adduction isometric with pillow between knees;  working up to 100% effort  Comment #5: 10 seconds X 10 reps  Exercise #6: prone hip ext  Comment #6: 20 reps  Exercise #7: clamshells  Comment #7: 20       Treatment Today     TREATMENT MINUTES COMMENTS   Evaluation     Self-care/ Home management     Manual therapy 25 Pt supine with bolster under knees;  MFR right knee, distal toward proximal for swelling   Neuromuscular Re-education     Therapeutic Activity     Therapeutic Exercises 0 See above or flow sheet   Gait training     Modality__________________     Electrical stimulation           Total 25    Blank areas are intentional and mean the treatment did not include these items.       Kenna Simms, PT  10/25/2017

## 2021-06-14 NOTE — PROGRESS NOTES
"Nutrition Assessment    Patient seen for out patient MNT inital assessment.    Referring diagnosis: Hyperlipidemia (272.4)    Height:  5' 2\"  Most recent weight: 161 lbs  BMI: 29  BMI indication: 25-29.9 overweight    Nutrition intervention that addressed medical nutrition therapy for above diagnosis: Heart healthy diet recommendations    RD reviewed label reading, physical activity and other. Discussed heart healthy diet recommendations including complex carbohydrates, fiber, saturated fats, and ways to increase fruit/vegetable intake.      Lifestyle changes made prior to nutrition session: David wears a FitBit and tracks her steps-- typically gets 5-10K steps in a day around the house. Also does yard work. Consumes low fat dairy products and avoids excess sodium. Eats fatty fish (salmon) ~1x/week    Assessment of diet/weight history: David came to RD with goal of lowering her LDL levels through diet and exercise rather than having to rely on medication. She has a family history of high cholesterol and we did discuss the role of genetics in cholesterol levels. She tries to use whole grains or brown rice in cooking but does not always do this.  She does not eat many fruits or vegetables presently.    Breakfast: Oatmeal (with walnuts and brown sugar), cottage cheese, yogurt, or out to eat. Drinks a few cups of black coffee.    Lunch: Varies. Salad, soup, or leftovers. Sometimes will eat a snack rather than full meal.    Dinner: Meat (typically chicken w/o skin, frozen salmon once a week), rice or potatoes, and occasional vegetable (corn, peas, green beans, [place]' sprouts)    Assessment of physical activity: Mostly walking around the house. She does yard work/snow shoveling depending on the weather. Occasionally goes on walks w/ partner. RD reviewed importance of physical activity in managing cholesterol levels.    Goals: 1) Add non-starchy vegetable to every dinner meal  2) Add fruit to morning oatmeal instead " of brown sugar    Patient had no barriers to learning, verbalized understanding, and compliance is expected.    Phone number provided for questions. Recommended follow-up in  as needed.    Thank you for this consult. Call me at [phone] for questions.

## 2021-06-14 NOTE — PROGRESS NOTES
Chief complaint: Right knee swelling  Left thumb pain    HPI: The patient mentioned as an aside when she was here for a physical a few months ago, she had right knee swelling without an obvious inciting injury.  She did not have locking popping giving out on her weakness.  I sent her to physical therapy and she went to 6 visits and it really did not change anything.  She had what sounds like iontophoresis and massage and they could not come up with a specific diagnosis either.  She now presents because she is wondering what is causing the inflammation and what the next steps are.    She also some complaining of some left thumb pain at the base of her thumb and it is a little bit swollen.  She denies numbness tingling or weakness    Objective:/76 (Patient Site: Right Arm, Patient Position: Sitting, Cuff Size: Adult Regular)  Pulse 64  Wt 161 lb 4.8 oz (73.2 kg)  Breastfeeding? No  BMI 29.03 kg/m2  She is in no acute distress but she has visibly swollen right knee compared to the left.  It seems to be in the suprapatellar bursa as well as the infrapatellar bursa.  There is no warmth or redness or induration to the joint effusion.  The range of motion of the knee is good with the exception of decreased flexion due to the effusion.  There is no ligament laxity and Khoi's is negative.    She has some swelling at the base of her left thumb and she is fairly tender over the thenar eminence.  I am wondering if she does not have either an osteophyte at the base of her thumb or a ganglion cyst.  Finkelstein's test is negative.    Assessment: Right knee pain with effusion  Left thumb pain with swelling    Plan: We will do an MRI of the right knee.  We will set her up for orthopedic consult with someone for her knee and a different person, a hand surgeon, for her left hand.  We talked about the fact that the hand surgeon will do an x-ray and I explained the reasons I would not do one today so that we do not  duplicate services.  She is comfortable with that and will follow up with or so after she has her MRI.  She has had an MRI in the past and is not claustrophobic

## 2021-06-16 PROBLEM — E78.5 HYPERLIPIDEMIA: Status: ACTIVE | Noted: 2017-09-27

## 2021-06-18 NOTE — PATIENT INSTRUCTIONS - HE
Patient Instructions by Tawana Spear MD at 7/15/2020  9:10 AM     Author: Tawana Spear MD Service: -- Author Type: Physician    Filed: 7/15/2020  9:27 AM Encounter Date: 7/15/2020 Status: Signed    : Tawana Spear MD (Physician)         Patient Education     Exercise for a Healthier Heart  You may wonder how you can improve the health of your heart. If youre thinking about exercise, youre on the right track. You dont need to become an athlete, but you do need a certain amount of brisk exercise to help strengthen your heart. If you have been diagnosed with a heart condition, your doctor may recommend exercise to help stabilize your condition. To help make exercise a habit, choose safe, fun activities.       Be sure to check with your health care provider before starting an exercise program.    Why exercise?  Exercising regularly offers many healthy rewards. It can help you do all of the following:    Improve your blood cholesterol levels to help prevent further heart trouble    Lower your blood pressure to help prevent a stroke or heart attack    Control diabetes, or reduce your risk of getting this disease    Improve your heart and lung function    Reach and maintain a healthy weight    Make your muscles stronger and more limber so you can stay active    Prevent falls and fractures by slowing the loss of bone mass (osteoporosis)    Manage stress better  Exercise tips  Ease into your routine. Set small goals. Then build on them.  Exercise on most days. Aim for a total of 150 or more minutes of moderate to  vigorous intensity activity each week. Consider 40 minutes, 3 to 4 times a week. For best results, activity should last for 40 minutes on average. It is OK to work up to the 40 minute period over time. Examples of moderate-intensity activity is walking one mile in 15 minutes or 30 to 45 minutes of yard work.  Step up your daily activity level. Along with your exercise program, try being more  active throughout the day. Walk instead of drive. Do more household tasks or yard work.  Choose one or more activities you enjoy. Walking is one of the easiest things you can do. You can also try swimming, riding a bike, or taking an exercise class.  Stop exercising and call your doctor if you:    Have chest pain or feel dizzy or lightheaded    Feel burning, tightness, pressure, or heaviness in your chest, neck, shoulders, back, or arms    Have unusual shortness of breath    Have increased joint or muscle pain    Have palpitations or an irregular heartbeat      6181-8815 [employer]. [address] [place] [address]. All rights reserved. This information is not intended as a substitute for professional medical care. Always follow your healthcare professional's instructions.         Patient Education   Understanding Squee MyPlate  The USDA (US Department of Agriculture) has guidelines to help you make healthy food choices. These are called MyPlate. MyPlate shows the food groups that make up healthy meals using the image of a place setting. Before you eat, think about the healthiest choices for what to put onto your plate or into your cup or bowl. To learn more about building a healthy plate, visit www.choosemyplate.gov.       The Food Groups    Fruits: Any fruit or 100% fruit juice counts as part of the Fruit Group. Fruits may be fresh, canned, frozen, or dried, and may be whole, cut-up, or pureed. Make half your plate fruits and vegetables.    Vegetables: Any vegetable or 100% vegetable juice counts as a member of the Vegetable Group. Vegetables may be fresh, frozen, canned, or dried. They can be served raw or cooked and may be whole, cut-up, or mashed. Make half your plate fruits and vegetables.     Grains: All foods made from grains are part of the Grains Group. These include wheat, rice, oats, cornmeal, and barley such as bread, pasta, oatmeal, cereal, tortillas, and grits. Grains should be  no more than a quarter of your plate. At least half of your grains should be whole grains.    Protein: This group includes meat, poultry, seafood, beans and peas, eggs, processed soy products (like tofu), nuts (including nut butters), and seeds. Make protein choices no more than a quarter of your plate. Meat and poultry choices should be lean or low fat.    Dairy: All fluid milk products and foods made from milk that contain calcium, like yogurt and cheese are part of the Dairy Group. (Foods that have little calcium, such as cream, butter, and cream cheese, are not part of the group.) Most dairy choices should be low-fat or fat-free.    Oils: These are fats that are liquid at room temperature. They include canola, corn, olive, soybean, and sunflower oil. Foods that are mainly oil include mayonnaise, certain salad dressings, and soft margarines. You should have only 5 to 7 teaspoons of oils a day. You probably already get this much from the food you eat.  Use [employer] to Help Build Your Meals  The [employer] can help you plan and track your meals and activity. You can look up individual foods to see or compare their nutritional value. You can get guidelines for what and how much you should eat. You can compare your food choices. And you can assess personal physical activities and see ways you can improve. Go to www.oragenics.gov/supertracker/.    6515-6727 The "[employer]". [address] [place] [address], [place] [address]. All rights reserved. This information is not intended as a substitute for professional medical care. Always follow your healthcare professional's instructions.           Patient Education   Personalized Prevention Plan  You are due for the preventive services outlined below.  Your care team is available to assist you in scheduling these services.  If you have already completed any of these items, please share that information with your care team to update in your medical record.  Health  Maintenance   Topic Date Due   ? DEPRESSION ACTION PLAN  1955   ? HEPATITIS C SCREENING  1955   ? ZOSTER VACCINES (2 of 3) 11/23/2017   ? MEDICARE ANNUAL WELLNESS VISIT  11/25/2018   ? PNEUMOCOCCAL IMMUNIZATION 65+ LOW/MEDIUM RISK (1 of 2 - PCV13) 11/25/2018   ? DXA SCAN  11/25/2018   ? FALL RISK ASSESSMENT  11/25/2018   ? INFLUENZA VACCINE RULE BASED (1) 08/01/2020   ? MAMMOGRAM  01/28/2022   ? ADVANCE CARE PLANNING  09/18/2022   ? LIPID  01/29/2025   ? COLORECTAL CANCER SCREENING  04/01/2025   ? TD 18+ HE  09/18/2027   ? HEPATITIS B VACCINES  Aged Out

## 2021-06-19 NOTE — LETTER
Letter by Tawana Spear MD at      Author: Tawana Spear MD Service: -- Author Type: --    Filed:  Encounter Date: 10/3/2019 Status: Signed         David Nugent  [address] [place] [address]             October 3, 2019         Dear Ms. Nugent,    According to our records, it is the time of year for your annual exam.  Please use my chart or call the clinic at [phone] and schedule an appointment with your provider.    Please call with questions or contact us using [employer].    Sincerely,        Electronically signed by Tawana Spear MD

## 2021-06-21 NOTE — PROGRESS NOTES
Chief complaint: Medication check    HPI: The patient lost track I will how long it had been since she been in to be seen to get a refill.  She has not had a chance to schedule physical yet because she just went on Medicare in 2 months their Medicare supplemental plan is good to change again.    She does very well with the citalopram and wants to discontinue that as it is.    She has her B simplex type II and often feels the outbreak coming in the Cymbalta Rx for that.  We reviewed her dosage and I think that doing 1000 mg at a time, twice a day for up to 3 days would be a better option for her and she would like to try that so we will refill that as well as the citalopram.  She is due for mammogram so we will put that order in as well    Objective:/60 (Patient Site: Right Arm, Patient Position: Sitting, Cuff Size: Adult Regular)  Pulse 78  Wt 154 lb 2 oz (69.9 kg)  SpO2 98%  Breastfeeding? No  BMI 27.74 kg/m2  PHQ 9 was completed and she is doing well    She is wearing glasses and we reviewed her medications and the fact that she had a bone density about 2-1/2 years ago.  I have suggested that she get onto her Medicare with her new supplemental plan to come in for a welcome to Medicare check so that we can make sure that we are covering everything that needs to be completed.    Assessment: Medication check for dysthymia doing well  Type II herpes simplex refill medication    Plan: I refilled her medications and will see her probably in January after she get situated with her new insurance so that we can get all of the rest of her health care maintenance issues addressed.

## 2021-06-26 ENCOUNTER — HEALTH MAINTENANCE LETTER (OUTPATIENT)
Age: 66
End: 2021-06-26

## 2021-08-21 ENCOUNTER — HEALTH MAINTENANCE LETTER (OUTPATIENT)
Age: 66
End: 2021-08-21

## 2021-09-02 DIAGNOSIS — F34.1 DYSTHYMIC DISORDER: ICD-10-CM

## 2021-09-02 RX ORDER — CITALOPRAM HYDROBROMIDE 20 MG/1
TABLET ORAL
Qty: 30 TABLET | Refills: 0 | Status: SHIPPED | OUTPATIENT
Start: 2021-09-02 | End: 2021-10-04

## 2021-09-02 NOTE — TELEPHONE ENCOUNTER
"Routing refill request to provider for review/approval because:  Ida given x1 on 7/31/21 and patient did not follow up, please advise  Patient needs to be seen because it has been more than 1 year since last office visit.    Last office visit provider:  7/15/20     Requested Prescriptions   Pending Prescriptions Disp Refills     citalopram (CELEXA) 20 MG tablet [Pharmacy Med Name: CITALOPRAM 20MG TABLETS] 30 tablet 0     Sig: TAKE 1 TABLET(20 MG) BY MOUTH DAILY       SSRIs Protocol Failed - 9/2/2021  3:55 AM        Failed - PHQ-9 score less than 5 in past 6 months     Please review last PHQ-9 score.           Failed - Recent (6 mo) or future (30 days) visit within the authorizing provider's specialty     Patient had office visit in the last 6 months or has a visit in the next 30 days with authorizing provider or within the authorizing provider's specialty.  See \"Patient Info\" tab in inbasket, or \"Choose Columns\" in Meds & Orders section of the refill encounter.            Passed - Medication is active on med list        Passed - Patient is age 18 or older        Passed - No active pregnancy on record        Passed - No positive pregnancy test in last 12 months             Kathia Desai, PharmVIKAS 09/02/21 2:07 PM    "

## 2021-09-21 DIAGNOSIS — B00.9 HERPES SIMPLEX TYPE II INFECTION: ICD-10-CM

## 2021-09-21 NOTE — TELEPHONE ENCOUNTER
"Routing refill request to provider for review/approval because:  Labs not current:  CR  Patient needs to be seen because it has been more than 1 year since last office visit.    Last Written Prescription Date:  7/15/2020 with an end date of 7/18/2020  Last Fill Quantity: 12,  # refills: 5   Last office visit provider:  7/15/2020     Requested Prescriptions   Pending Prescriptions Disp Refills     valACYclovir (VALTREX) 1000 mg tablet [Pharmacy Med Name: VALACYCLOVIR 1GM TABLETS] 12 tablet 5     Sig: TAKE 1 TABLET(1000 MG) BY MOUTH TWICE DAILY FOR 3 DAYS       Antivirals for Herpes Protocol Failed - 9/21/2021 10:19 AM        Failed - Recent (12 mo) or future (30 days) visit within the authorizing provider's specialty     Patient has had an office visit with the authorizing provider or a provider within the authorizing providers department within the previous 12 mos or has a future within next 30 days. See \"Patient Info\" tab in inbasket, or \"Choose Columns\" in Meds & Orders section of the refill encounter.              Failed - Normal serum creatinine on file in past 12 months     Recent Labs   Lab Test 07/15/20  0945   CR 0.80       Ok to refill medication if creatinine is low          Passed - Patient is age 12 or older        Passed - Medication is active on med list             Hansel Soto RN 09/21/21 4:29 PM  "

## 2021-09-22 RX ORDER — VALACYCLOVIR HYDROCHLORIDE 1 G/1
TABLET, FILM COATED ORAL
Qty: 12 TABLET | Refills: 5 | Status: SHIPPED | OUTPATIENT
Start: 2021-09-22 | End: 2021-12-06

## 2021-10-04 ENCOUNTER — MYC REFILL (OUTPATIENT)
Dept: FAMILY MEDICINE | Facility: CLINIC | Age: 66
End: 2021-10-04

## 2021-10-04 DIAGNOSIS — F34.1 DYSTHYMIC DISORDER: ICD-10-CM

## 2021-10-05 RX ORDER — CITALOPRAM HYDROBROMIDE 20 MG/1
20 TABLET ORAL DAILY
Qty: 30 TABLET | Refills: 0 | Status: SHIPPED | OUTPATIENT
Start: 2021-10-05 | End: 2021-11-04

## 2021-10-05 NOTE — TELEPHONE ENCOUNTER
"Routing refill request to provider for review/approval because:  Patient needs to be seen because it has been more than 1 year since last office visit.    Last Written Prescription Date:  9/2/2021  Last Fill Quantity: 30,  # refills: 0   Last office visit provider:  7/15/2020     Requested Prescriptions   Pending Prescriptions Disp Refills     citalopram (CELEXA) 20 MG tablet 30 tablet 0     Sig: Take 1 tablet (20 mg) by mouth daily       SSRIs Protocol Failed - 10/4/2021  9:34 PM        Failed - PHQ-9 score less than 5 in past 6 months     Please review last PHQ-9 score.           Failed - Recent (6 mo) or future (30 days) visit within the authorizing provider's specialty     Patient had office visit in the last 6 months or has a visit in the next 30 days with authorizing provider or within the authorizing provider's specialty.  See \"Patient Info\" tab in inbasket, or \"Choose Columns\" in Meds & Orders section of the refill encounter.            Passed - Medication is active on med list        Passed - Patient is age 18 or older        Passed - No active pregnancy on record        Passed - No positive pregnancy test in last 12 months             Melia Staley RN 10/05/21 12:03 PM  "

## 2021-10-16 ENCOUNTER — HEALTH MAINTENANCE LETTER (OUTPATIENT)
Age: 66
End: 2021-10-16

## 2021-11-04 DIAGNOSIS — F34.1 DYSTHYMIC DISORDER: ICD-10-CM

## 2021-11-04 RX ORDER — CITALOPRAM HYDROBROMIDE 20 MG/1
20 TABLET ORAL DAILY
Qty: 30 TABLET | Refills: 0 | Status: SHIPPED | OUTPATIENT
Start: 2021-11-04 | End: 2021-12-06

## 2021-11-04 NOTE — TELEPHONE ENCOUNTER
Reason for Call:  Other prescription    Detailed comments: refill request;    Citalopram 20 MG tablet    Phone Number Pharmacy can be reached at: Other phone number:  [phone]    Best Time: anytime    Can we leave a detailed message on this number? YES    Call taken on 11/4/2021 at 8:51 AM by Brittney White

## 2021-11-22 ENCOUNTER — TELEPHONE (OUTPATIENT)
Dept: FAMILY MEDICINE | Facility: CLINIC | Age: 66
End: 2021-11-22

## 2021-11-22 DIAGNOSIS — Z20.822 EXPOSURE TO 2019 NOVEL CORONAVIRUS: Primary | ICD-10-CM

## 2021-11-22 NOTE — TELEPHONE ENCOUNTER
Reason for Call:  Other call back    Detailed comments: Pt and  has been exposed to someone on Friday night that tested positive for COVID - no symptoms at this time. Advising on what to do. Please call back.    Phone Number Patient can be reached at: Home number on file [phone] (home)    Best Time: ANY    Can we leave a detailed message on this number? YES    Call taken on 11/22/2021 at 11:27 AM by Julio Arias

## 2021-11-24 ENCOUNTER — LAB (OUTPATIENT)
Dept: LAB | Facility: CLINIC | Age: 66
End: 2021-11-24
Attending: FAMILY MEDICINE
Payer: COMMERCIAL

## 2021-11-24 DIAGNOSIS — Z20.822 EXPOSURE TO 2019 NOVEL CORONAVIRUS: ICD-10-CM

## 2021-11-24 PROCEDURE — U0003 INFECTIOUS AGENT DETECTION BY NUCLEIC ACID (DNA OR RNA); SEVERE ACUTE RESPIRATORY SYNDROME CORONAVIRUS 2 (SARS-COV-2) (CORONAVIRUS DISEASE [COVID-19]), AMPLIFIED PROBE TECHNIQUE, MAKING USE OF HIGH THROUGHPUT TECHNOLOGIES AS DESCRIBED BY CMS-2020-01-R: HCPCS

## 2021-11-24 PROCEDURE — U0005 INFEC AGEN DETEC AMPLI PROBE: HCPCS

## 2021-11-26 LAB — SARS-COV-2 RNA RESP QL NAA+PROBE: NEGATIVE

## 2021-12-06 ENCOUNTER — OFFICE VISIT (OUTPATIENT)
Dept: FAMILY MEDICINE | Facility: CLINIC | Age: 66
End: 2021-12-06
Payer: COMMERCIAL

## 2021-12-06 VITALS
WEIGHT: 166.1 LBS | HEIGHT: 62 IN | SYSTOLIC BLOOD PRESSURE: 118 MMHG | DIASTOLIC BLOOD PRESSURE: 64 MMHG | HEART RATE: 74 BPM | BODY MASS INDEX: 30.57 KG/M2

## 2021-12-06 DIAGNOSIS — E55.9 VITAMIN D DEFICIENCY: Chronic | ICD-10-CM

## 2021-12-06 DIAGNOSIS — A60.00 GENITAL HERPES SIMPLEX, UNSPECIFIED SITE: ICD-10-CM

## 2021-12-06 DIAGNOSIS — E78.5 HYPERLIPIDEMIA, UNSPECIFIED HYPERLIPIDEMIA TYPE: ICD-10-CM

## 2021-12-06 DIAGNOSIS — B00.9 HERPES SIMPLEX TYPE II INFECTION: ICD-10-CM

## 2021-12-06 DIAGNOSIS — Z00.00 ROUTINE GENERAL MEDICAL EXAMINATION AT A HEALTH CARE FACILITY: Primary | ICD-10-CM

## 2021-12-06 DIAGNOSIS — N95.2 POSTMENOPAUSAL ATROPHIC VAGINITIS: ICD-10-CM

## 2021-12-06 DIAGNOSIS — F34.1 DYSTHYMIC DISORDER: ICD-10-CM

## 2021-12-06 PROCEDURE — 99397 PER PM REEVAL EST PAT 65+ YR: CPT | Performed by: FAMILY MEDICINE

## 2021-12-06 RX ORDER — VALACYCLOVIR HYDROCHLORIDE 1 G/1
TABLET, FILM COATED ORAL
Qty: 24 TABLET | Refills: 5 | Status: SHIPPED | OUTPATIENT
Start: 2021-12-06 | End: 2023-01-16

## 2021-12-06 RX ORDER — CITALOPRAM HYDROBROMIDE 20 MG/1
20 TABLET ORAL DAILY
Qty: 90 TABLET | Refills: 3 | Status: SHIPPED | OUTPATIENT
Start: 2021-12-06 | End: 2022-12-21

## 2021-12-06 ASSESSMENT — MIFFLIN-ST. JEOR: SCORE: 1236.67

## 2021-12-06 ASSESSMENT — ACTIVITIES OF DAILY LIVING (ADL): CURRENT_FUNCTION: NO ASSISTANCE NEEDED

## 2021-12-06 NOTE — PROGRESS NOTES
"SUBJECTIVE:   David Nugent is a 66 year old female who presents for Preventive Visit.    She will come in at a later date so she can be fasting for her lipid panel.    She seen her dermatologist for full body check and she had her eye doctor appointment last October.  We discussed the shingles vaccine and she is opting to wait another year for that the rest of her health maintenance issues have been addressed.     Patient has been advised of split billing requirements and indicates understanding: Yes   Are you in the first 12 months of your Medicare coverage?  No    Healthy Habits:     In general, how would you rate your overall health?  Excellent    Frequency of exercise:  None    Do you usually eat at least 4 servings of fruit and vegetables a day, include whole grains    & fiber and avoid regularly eating high fat or \"junk\" foods?  No    Taking medications regularly:  Yes    Ability to successfully perform activities of daily living:  No assistance needed    Home Safety:  No safety concerns identified    Hearing Impairment:  No hearing concerns    In the past 6 months, have you been bothered by leaking of urine?  No    In general, how would you rate your overall mental or emotional health?  Good      PHQ-2 Total Score: 0    Additional concerns today:  No    Do you feel safe in your environment? Yes    Have you ever done Advance Care Planning? (For example, a Health Directive, POLST, or a discussion with a medical provider or your loved ones about your wishes): Yes, advance care planning is on file.       Fall risk; no falls in the last year       Cognitive Screening   1) Repeat 3 items (Leader, Season, Table)     2) Clock draw:  NORMAL  3) 3 item recall:  Recalls 3 objects  Results: 3 items recalled: COGNITIVE IMPAIRMENT LESS LIKELY    Mini-CogTM Copyright MIRELA Seay. Licensed by the author for use in [facility]; reprinted with permission ([email or web address]@.[email or web address]). All rights reserved.      Do you have " sleep apnea, excessive snoring or daytime drowsiness?: no    Reviewed and updated as needed this visit by clinical staff                Reviewed and updated as needed this visit by Provider               Social History     Tobacco Use     Smoking status: Former Smoker     Quit date: 1976     Years since quittin.9     Smokeless tobacco: Never Used   Substance Use Topics     Alcohol use: Yes     Alcohol/week: 4.0 - 6.0 standard drinks          Alcohol Use 2021   Prescreen: >3 drinks/day or >7 drinks/week? No       Current providers sharing in care for this patient include:    Patient Care Team:  Tawana Spear MD as PCP - General (Family Practice)  Tawana Spear MD as Assigned PCP    The following health maintenance items are reviewed in Epic and correct as of today:  Health Maintenance Due   Topic Date Due     ANNUAL REVIEW OF HM ORDERS  Never done     DEPRESSION ACTION PLAN  Never done     ZOSTER IMMUNIZATION (2 of 3) 2017     PHQ-9  01/15/2021     MEDICARE ANNUAL WELLNESS VISIT  07/15/2021     FALL RISK ASSESSMENT  07/15/2021     Lab ordered       FHS-7:   Breast CA Risk Assessment (FHS-7) 2021   Did any of your first-degree relatives have breast or ovarian cancer? No   Did any of your relatives have bilateral breast cancer? No   Did any man in your family have breast cancer? No   Did any woman in your family have breast and ovarian cancer? Yes   Did any woman in your family have breast cancer before age 50 y? Yes   Do you have 2 or more relatives with breast and/or ovarian cancer? Yes   Do you have 2 or more relatives with breast and/or bowel cancer? Yes       Mammogram Screening: Recommended mammography every 1-2 years with patient discussion and risk factor consideration  Pertinent mammograms are reviewed under the imaging tab.    Review of Systems  Constitutional, HEENT, cardiovascular, pulmonary, gi and gu systems are negative, except as otherwise noted.    OBJECTIVE:   There were  "no vitals taken for this visit. Estimated body mass index is 28.87 kg/m  as calculated from the following:    Height as of 7/15/20: 1.588 m (5' 2.5\").    Weight as of 7/15/20: 72.8 kg (160 lb 6.4 oz).  Physical Exam  GENERAL: healthy, alert and no distress  EYES: Eyes grossly normal to inspection, PERRL and conjunctivae and sclerae normal, glasses  HENT: normal cephalic/atraumatic and ear canals and TM's normal  NECK: no adenopathy, no asymmetry, masses, or scars and thyroid normal to palpation  RESP: lungs clear to auscultation - no rales, rhonchi or wheezes  BREAST: deferred  CV: regular rate and rhythm, normal S1 S2, no S3 or S4, no murmur, click or rub, no peripheral edema and peripheral pulses strong  ABDOMEN: soft, nontender, no hepatosplenomegaly, no masses and bowel sounds normal  MS: no gross musculoskeletal defects noted, no edema  SKIN: no suspicious lesions or rashes  NEURO: Normal strength and tone, mentation intact and speech normal  PSYCH: mentation appears normal, affect normal/bright       ASSESSMENT / PLAN:       ICD-10-CM    1. Routine general medical examination at a health care facility  Z00.00 Comprehensive metabolic panel   2. Vitamin D deficiency  E55.9 Vitamin D Deficiency   3. Hyperlipidemia, unspecified hyperlipidemia type  E78.5 Comprehensive metabolic panel     Lipid Profile     Lipoprotein (a)     CT Coronary Calcium Scan   4. Dysthymic disorder  F34.1 citalopram (CELEXA) 20 MG tablet   5. Postmenopausal atrophic vaginitis  N95.2    6. Genital herpes simplex, unspecified site  A60.00    7. Herpes simplex type II infection  B00.9 valACYclovir (VALTREX) 1000 mg tablet       Patient has been advised of split billing requirements and indicates understanding: Yes  COUNSELING:  Reviewed preventive health counseling, as reflected in patient instructions       Regular exercise       Healthy diet/nutrition       Vision screening       Osteoporosis prevention/bone health       Colon cancer " "screening    Estimated body mass index is 28.87 kg/m  as calculated from the following:    Height as of 7/15/20: 1.588 m (5' 2.5\").    Weight as of 7/15/20: 72.8 kg (160 lb 6.4 oz).    Weight management plan: Discussed healthy diet and exercise guidelines    She reports that she quit smoking about 45 years ago. She has never used smokeless tobacco.      Appropriate preventive services were discussed with this patient, including applicable screening as appropriate for cardiovascular disease, diabetes, osteopenia/osteoporosis, and glaucoma.  As appropriate for age/gender, discussed screening for colorectal cancer, prostate cancer, breast cancer, and cervical cancer. Checklist reviewing preventive services available has been given to the patient.    Reviewed patients plan of care and provided an AVS. The Basic Care Plan (routine screening as documented in Health Maintenance) for David meets the Care Plan requirement. This Care Plan has been established and reviewed with the Patient.    Counseling Resources:  ATP IV Guidelines  Pooled Cohorts Equation Calculator  Breast Cancer Risk Calculator  Breast Cancer: Medication to Reduce Risk  FRAX Risk Assessment  ICSI Preventive Guidelines  Dietary Guidelines for Americans, 2010  USDA's MyPlate  ASA Prophylaxis  Lung CA Screening    Tawana Spear MD  [facility]    Identified Health Risks:  "

## 2021-12-14 ENCOUNTER — LAB (OUTPATIENT)
Dept: LAB | Facility: CLINIC | Age: 66
End: 2021-12-14
Payer: COMMERCIAL

## 2021-12-14 DIAGNOSIS — Z00.00 ROUTINE GENERAL MEDICAL EXAMINATION AT A HEALTH CARE FACILITY: ICD-10-CM

## 2021-12-14 DIAGNOSIS — E78.5 HYPERLIPIDEMIA, UNSPECIFIED HYPERLIPIDEMIA TYPE: ICD-10-CM

## 2021-12-14 DIAGNOSIS — E55.9 VITAMIN D DEFICIENCY: Chronic | ICD-10-CM

## 2021-12-14 LAB
ALBUMIN SERPL-MCNC: 4 G/DL (ref 3.5–5)
ALP SERPL-CCNC: 82 U/L (ref 45–120)
ALT SERPL W P-5'-P-CCNC: 19 U/L (ref 0–45)
ANION GAP SERPL CALCULATED.3IONS-SCNC: 9 MMOL/L (ref 5–18)
AST SERPL W P-5'-P-CCNC: 22 U/L (ref 0–40)
BILIRUB SERPL-MCNC: 0.5 MG/DL (ref 0–1)
BUN SERPL-MCNC: 14 MG/DL (ref 8–22)
CALCIUM SERPL-MCNC: 9.7 MG/DL (ref 8.5–10.5)
CHLORIDE BLD-SCNC: 103 MMOL/L (ref 98–107)
CHOLEST SERPL-MCNC: 278 MG/DL
CO2 SERPL-SCNC: 27 MMOL/L (ref 22–31)
CREAT SERPL-MCNC: 0.82 MG/DL (ref 0.6–1.1)
FASTING STATUS PATIENT QL REPORTED: ABNORMAL
GFR SERPL CREATININE-BSD FRML MDRD: 74 ML/MIN/1.73M2
GLUCOSE BLD-MCNC: 95 MG/DL (ref 70–125)
HDLC SERPL-MCNC: 86 MG/DL
LDLC SERPL CALC-MCNC: 169 MG/DL
POTASSIUM BLD-SCNC: 4.4 MMOL/L (ref 3.5–5)
PROT SERPL-MCNC: 7 G/DL (ref 6–8)
SODIUM SERPL-SCNC: 139 MMOL/L (ref 136–145)
TRIGL SERPL-MCNC: 113 MG/DL

## 2021-12-14 PROCEDURE — 99000 SPECIMEN HANDLING OFFICE-LAB: CPT

## 2021-12-14 PROCEDURE — 80061 LIPID PANEL: CPT

## 2021-12-14 PROCEDURE — 82306 VITAMIN D 25 HYDROXY: CPT

## 2021-12-14 PROCEDURE — 83695 ASSAY OF LIPOPROTEIN(A): CPT

## 2021-12-14 PROCEDURE — 80053 COMPREHEN METABOLIC PANEL: CPT

## 2021-12-14 PROCEDURE — 36415 COLL VENOUS BLD VENIPUNCTURE: CPT

## 2021-12-15 LAB
DEPRECATED CALCIDIOL+CALCIFEROL SERPL-MC: 52 UG/L (ref 30–80)
LPA SERPL-MCNC: 9 MG/DL

## 2021-12-20 ENCOUNTER — HOSPITAL ENCOUNTER (OUTPATIENT)
Dept: CT IMAGING | Facility: CLINIC | Age: 66
Discharge: HOME | End: 2021-12-20
Attending: FAMILY MEDICINE | Admitting: FAMILY MEDICINE

## 2021-12-20 DIAGNOSIS — E78.5 HYPERLIPIDEMIA, UNSPECIFIED HYPERLIPIDEMIA TYPE: ICD-10-CM

## 2021-12-20 LAB
CV CALCIUM SCORE AGATSTON LM: 0
CV CALCIUM SCORING AGATSON LAD: 0
CV CALCIUM SCORING AGATSTON CX: 0
CV CALCIUM SCORING AGATSTON RCA: 0
CV CALCIUM SCORING AGATSTON TOTAL: 0

## 2021-12-20 PROCEDURE — 75571 CT HRT W/O DYE W/CA TEST: CPT

## 2021-12-20 PROCEDURE — 75571 CT HRT W/O DYE W/CA TEST: CPT | Mod: 26 | Performed by: INTERNAL MEDICINE

## 2022-09-25 ENCOUNTER — HEALTH MAINTENANCE LETTER (OUTPATIENT)
Age: 67
End: 2022-09-25

## 2022-11-14 ENCOUNTER — HOSPITAL ENCOUNTER (OUTPATIENT)
Dept: MAMMOGRAPHY | Facility: CLINIC | Age: 67
Discharge: HOME | End: 2022-11-14
Admitting: NURSE PRACTITIONER
Payer: COMMERCIAL

## 2022-11-14 DIAGNOSIS — Z12.31 VISIT FOR SCREENING MAMMOGRAM: ICD-10-CM

## 2022-11-14 PROCEDURE — 77067 SCR MAMMO BI INCL CAD: CPT

## 2022-12-20 DIAGNOSIS — F34.1 DYSTHYMIC DISORDER: ICD-10-CM

## 2022-12-21 RX ORDER — CITALOPRAM HYDROBROMIDE 20 MG/1
20 TABLET ORAL DAILY
Qty: 90 TABLET | Refills: 3 | OUTPATIENT
Start: 2022-12-21

## 2023-01-10 ENCOUNTER — OFFICE VISIT (OUTPATIENT)
Dept: FAMILY MEDICINE | Facility: CLINIC | Age: 68
End: 2023-01-10
Payer: COMMERCIAL

## 2023-01-10 VITALS
OXYGEN SATURATION: 97 % | HEIGHT: 63 IN | SYSTOLIC BLOOD PRESSURE: 116 MMHG | WEIGHT: 163 LBS | RESPIRATION RATE: 12 BRPM | TEMPERATURE: 98.1 F | BODY MASS INDEX: 28.88 KG/M2 | HEART RATE: 65 BPM | DIASTOLIC BLOOD PRESSURE: 78 MMHG

## 2023-01-10 DIAGNOSIS — F34.1 DYSTHYMIC DISORDER: ICD-10-CM

## 2023-01-10 DIAGNOSIS — D12.6 BENIGN NEOPLASM OF COLON, UNSPECIFIED PART OF COLON: ICD-10-CM

## 2023-01-10 DIAGNOSIS — E55.9 VITAMIN D DEFICIENCY: Chronic | ICD-10-CM

## 2023-01-10 DIAGNOSIS — M89.9 DISORDER OF BONE AND CARTILAGE: ICD-10-CM

## 2023-01-10 DIAGNOSIS — C44.90 MALIGNANT NEOPLASM OF SKIN: ICD-10-CM

## 2023-01-10 DIAGNOSIS — Z78.0 POST-MENOPAUSE: ICD-10-CM

## 2023-01-10 DIAGNOSIS — E78.5 HYPERLIPIDEMIA, UNSPECIFIED HYPERLIPIDEMIA TYPE: ICD-10-CM

## 2023-01-10 DIAGNOSIS — F41.1 ANXIETY STATE: ICD-10-CM

## 2023-01-10 DIAGNOSIS — Z00.00 VISIT FOR PREVENTIVE HEALTH EXAMINATION: Primary | ICD-10-CM

## 2023-01-10 DIAGNOSIS — M94.9 DISORDER OF BONE AND CARTILAGE: ICD-10-CM

## 2023-01-10 LAB
ALT SERPL W P-5'-P-CCNC: 11 U/L (ref 10–35)
AST SERPL W P-5'-P-CCNC: 25 U/L (ref 10–35)
CHOLEST SERPL-MCNC: 240 MG/DL
CREAT SERPL-MCNC: 0.86 MG/DL (ref 0.51–0.95)
FASTING STATUS PATIENT QL REPORTED: YES
GFR SERPL CREATININE-BSD FRML MDRD: 73 ML/MIN/1.73M2
GLUCOSE SERPL-MCNC: 95 MG/DL (ref 70–99)
HDLC SERPL-MCNC: 78 MG/DL
LDLC SERPL CALC-MCNC: 137 MG/DL
NONHDLC SERPL-MCNC: 162 MG/DL
TRIGL SERPL-MCNC: 127 MG/DL
TSH SERPL DL<=0.005 MIU/L-ACNC: 2.37 UIU/ML (ref 0.3–4.2)

## 2023-01-10 PROCEDURE — 84443 ASSAY THYROID STIM HORMONE: CPT | Performed by: NURSE PRACTITIONER

## 2023-01-10 PROCEDURE — 84460 ALANINE AMINO (ALT) (SGPT): CPT | Performed by: NURSE PRACTITIONER

## 2023-01-10 PROCEDURE — 90677 PCV20 VACCINE IM: CPT | Performed by: NURSE PRACTITIONER

## 2023-01-10 PROCEDURE — 84450 TRANSFERASE (AST) (SGOT): CPT | Performed by: NURSE PRACTITIONER

## 2023-01-10 PROCEDURE — G0009 ADMIN PNEUMOCOCCAL VACCINE: HCPCS | Performed by: NURSE PRACTITIONER

## 2023-01-10 PROCEDURE — 99213 OFFICE O/P EST LOW 20 MIN: CPT | Mod: 25 | Performed by: NURSE PRACTITIONER

## 2023-01-10 PROCEDURE — G0439 PPPS, SUBSEQ VISIT: HCPCS | Performed by: NURSE PRACTITIONER

## 2023-01-10 PROCEDURE — 36415 COLL VENOUS BLD VENIPUNCTURE: CPT | Performed by: NURSE PRACTITIONER

## 2023-01-10 PROCEDURE — 82947 ASSAY GLUCOSE BLOOD QUANT: CPT | Performed by: NURSE PRACTITIONER

## 2023-01-10 PROCEDURE — 80061 LIPID PANEL: CPT | Performed by: NURSE PRACTITIONER

## 2023-01-10 PROCEDURE — 82565 ASSAY OF CREATININE: CPT | Performed by: NURSE PRACTITIONER

## 2023-01-10 RX ORDER — CITALOPRAM HYDROBROMIDE 20 MG/1
20 TABLET ORAL DAILY
Qty: 90 TABLET | Refills: 3 | Status: SHIPPED | OUTPATIENT
Start: 2023-01-10 | End: 2024-01-15

## 2023-01-10 ASSESSMENT — ENCOUNTER SYMPTOMS
CHILLS: 0
CONSTIPATION: 0
HEARTBURN: 1
ARTHRALGIAS: 1
DYSURIA: 0
PARESTHESIAS: 0
NAUSEA: 0
DIARRHEA: 0
DIZZINESS: 0
FREQUENCY: 0
MYALGIAS: 0
BREAST MASS: 0
NERVOUS/ANXIOUS: 0
HEADACHES: 0
SORE THROAT: 0
COUGH: 0
HEMATURIA: 0
EYE PAIN: 0
SHORTNESS OF BREATH: 0
ABDOMINAL PAIN: 0
FEVER: 0
WEAKNESS: 0
JOINT SWELLING: 0
PALPITATIONS: 0
HEMATOCHEZIA: 0

## 2023-01-10 ASSESSMENT — PATIENT HEALTH QUESTIONNAIRE - PHQ9
SUM OF ALL RESPONSES TO PHQ QUESTIONS 1-9: 0
SUM OF ALL RESPONSES TO PHQ QUESTIONS 1-9: 0
10. IF YOU CHECKED OFF ANY PROBLEMS, HOW DIFFICULT HAVE THESE PROBLEMS MADE IT FOR YOU TO DO YOUR WORK, TAKE CARE OF THINGS AT HOME, OR GET ALONG WITH OTHER PEOPLE: NOT DIFFICULT AT ALL

## 2023-01-10 ASSESSMENT — ACTIVITIES OF DAILY LIVING (ADL): CURRENT_FUNCTION: NO ASSISTANCE NEEDED

## 2023-01-10 NOTE — PROGRESS NOTES
"SUBJECTIVE:   David is a 67 year old who presents for Preventive Visit, wellness check.     She seen her dermatologist for full body check. Depression and anxiety are under control with Celexa.     Patient has been advised of split billing requirements and indicates understanding: Yes  Are you in the first 12 months of your Medicare coverage?  No    Healthy Habits:     In general, how would you rate your overall health?  Good    Frequency of exercise:  None    Do you usually eat at least 4 servings of fruit and vegetables a day, include whole grains    & fiber and avoid regularly eating high fat or \"junk\" foods?  No    Taking medications regularly:  Yes    Medication side effects:  None    Ability to successfully perform activities of daily living:  No assistance needed    Home Safety:  No safety concerns identified    Hearing Impairment:  No hearing concerns    In the past 6 months, have you been bothered by leaking of urine?  No    In general, how would you rate your overall mental or emotional health?  Good      PHQ-2 Total Score: 0    Additional concerns today:  Yes      Have you ever done Advance Care Planning? (For example, a Health Directive, POLST, or a discussion with a medical provider or your loved ones about your wishes): No, advance care planning information given to patient to review.  Patient declined advance care planning discussion at this time.      Fall risk  Fallen 2 or more times in the past year?: No  Any fall with injury in the past year?: No    Cognitive Screening   1) Repeat 3 items (Leader, Season, Table)    2) Clock draw: NORMAL  3) 3 item recall: Recalls 3 objects  Results: 3 items recalled: COGNITIVE IMPAIRMENT LESS LIKELY    Mini-CogTM Copyright MIRELA Seay. Licensed by the author for use in [facility]; reprinted with permission ([email or web address].[email or web address]). All rights reserved.      Do you have sleep apnea, excessive snoring or daytime drowsiness?: no    Reviewed and updated as needed " this visit by clinical staff   Tobacco  Allergies  Meds              Reviewed and updated as needed this visit by Provider                 Social History     Tobacco Use     Smoking status: Former     Packs/day: 0.00     Years: 0.00     Pack years: 0.00     Types: Cigarettes     Quit date: 1976     Years since quittin.0     Smokeless tobacco: Never   Substance Use Topics     Alcohol use: Yes     Alcohol/week: 4.0 - 6.0 standard drinks     If you drink alcohol do you typically have >3 drinks per day or >7 drinks per week? No    Alcohol Use 1/10/2023   Prescreen: >3 drinks/day or >7 drinks/week? No     Current providers sharing in care for this patient includePatient Care Team:  Melia Johnson APRN CNP as PCP - General (Family Medicine)  Tawana Spear MD as Assigned PCP    The following health maintenance items are reviewed in Epic and correct as of today:  Health Maintenance   Topic Date Due     MEDICARE ANNUAL WELLNESS VISIT  2022     PHQ-9  07/10/2023     COLORECTAL CANCER SCREENING  2023     ANNUAL REVIEW OF HM ORDERS  01/10/2024     FALL RISK ASSESSMENT  01/10/2024     MAMMO SCREENING  2024     LIPID  2026     DTAP/TDAP/TD IMMUNIZATION (4 - Td or Tdap) 2027     ADVANCE CARE PLANNING  01/10/2028     DEXA  2035     HEPATITIS C SCREENING  Completed     DEPRESSION ACTION PLAN  Completed     INFLUENZA VACCINE  Completed     Pneumococcal Vaccine: 65+ Years  Completed     ZOSTER IMMUNIZATION  Completed     COVID-19 Vaccine  Completed     IPV IMMUNIZATION  Aged Out     MENINGITIS IMMUNIZATION  Aged Out     LUNG CANCER SCREENING  Discontinued       FHS-7:   Breast CA Risk Assessment (FHS-7) 2021 2022 2023 1/10/2023   Did any of your first-degree relatives have breast or ovarian cancer? No Yes No No   Did any of your relatives have bilateral breast cancer? No No Unknown Unknown   Did any man in your family have breast cancer? No No No No   Did any woman  "in your family have breast and ovarian cancer? Yes No Yes Yes   Did any woman in your family have breast cancer before age 50 y? Yes No Unknown Unknown   Do you have 2 or more relatives with breast and/or ovarian cancer? Yes No Unknown Unknown   Do you have 2 or more relatives with breast and/or bowel cancer? Yes No Unknown Unknown       Mammogram Screening: Recommended mammography every 1-2 years with patient discussion and risk factor consideration  Pertinent mammograms are reviewed under the imaging tab.    Review of Systems   Constitutional: Negative for chills and fever.   HENT: Negative for congestion, ear pain, hearing loss and sore throat.    Eyes: Negative for pain and visual disturbance.   Respiratory: Negative for cough and shortness of breath.    Cardiovascular: Negative for chest pain, palpitations and peripheral edema.   Gastrointestinal: Positive for heartburn. Negative for abdominal pain, constipation, diarrhea, hematochezia and nausea.   Breasts:  Negative for tenderness, breast mass and discharge.   Genitourinary: Negative for dysuria, frequency, genital sores, hematuria, pelvic pain, urgency, vaginal bleeding and vaginal discharge.   Musculoskeletal: Positive for arthralgias. Negative for joint swelling and myalgias.   Skin: Negative for rash.   Neurological: Negative for dizziness, weakness, headaches and paresthesias.   Psychiatric/Behavioral: Positive for mood changes. The patient is not nervous/anxious.        OBJECTIVE:   /78   Pulse 65   Temp 98.1  F (36.7  C) (Oral)   Resp 12   Ht 1.588 m (5' 2.5\")   Wt 73.9 kg (163 lb)   SpO2 97%   BMI 29.34 kg/m   Estimated body mass index is 29.34 kg/m  as calculated from the following:    Height as of this encounter: 1.588 m (5' 2.5\").    Weight as of this encounter: 73.9 kg (163 lb).  Physical Exam  GENERAL: healthy, alert and no distress  EYES: Eyes grossly normal to inspection, PERRL and conjunctivae and sclerae normal  HENT: ear canals " and TM's normal, nose and mouth without ulcers or lesions  NECK: no adenopathy, no asymmetry, masses, or scars and thyroid normal to palpation  RESP: lungs clear to auscultation - no rales, rhonchi or wheezes  CV: regular rate and rhythm, normal S1 S2, no S3 or S4, no murmur, click or rub, no peripheral edema and peripheral pulses strong  ABDOMEN: soft, nontender, no hepatosplenomegaly, no masses and bowel sounds normal  MS: no gross musculoskeletal defects noted, no edema  SKIN: no suspicious lesions or rashes  NEURO: Normal strength and tone, mentation intact and speech normal  PSYCH: mentation appears normal, affect normal/bright    Diagnostic Test Results:  Labs reviewed in Epic    ASSESSMENT / PLAN:       ICD-10-CM    1. Visit for preventive health examination  Z00.00 Glucose     Creatinine     TSH     Glucose     Creatinine     TSH      2. Basal Cell Carcinoma Of The Skin  C44.90       3. Hyperlipidemia, unspecified hyperlipidemia type  E78.5 Lipid Profile (Chol, Trig, HDL, LDL calc)     AST     ALT     Lipid Profile (Chol, Trig, HDL, LDL calc)     AST     ALT      4. Osteopenia  M89.9 DX Hip/Pelvis/Spine    M94.9       5. Anxiety  F41.1       6. Vitamin D deficiency  E55.9       7. Dysthymic disorder  F34.1 citalopram (CELEXA) 20 MG tablet      8. Benign neoplasm of colon, unspecified part of colon  D12.6       9. Post-menopause  Z78.0 DX Hip/Pelvis/Spine          - your cholesterol level has improved! Good job trying to limit saturated fats from your diet. According the AHA guidelines (see below) you do not need to start a statin medication. Please continue to work on a low saturated fat diet and try your hardest to get regular exercise in every-week. Recheck this lab in one year.   - The 10-year ASCVD risk score (Karthikeyan MARTINEZ, et al., 2019) is: 6.9%    Values used to calculate the score:      Age: 67 years      Sex: Female      Is Non- : No      Diabetic: No      Tobacco smoker: No       "Systolic Blood Pressure: 116 mmHg      Is BP treated: No      HDL Cholesterol: 78 mg/dL      Total Cholesterol: 240 mg/dL    - your other labs appear stable. Good news!  - please continue vitamin D and Calcium, dexa ordered.   - depression/anxiety is stable and she can continue Celexa. Med refilled.   - conitnue to follow up with Derm once a year.     Patient has been advised of split billing requirements and indicates understanding: Yes      COUNSELING:  Reviewed preventive health counseling, as reflected in patient instructions      BMI:   Estimated body mass index is 29.34 kg/m  as calculated from the following:    Height as of this encounter: 1.588 m (5' 2.5\").    Weight as of this encounter: 73.9 kg (163 lb).   Weight management plan: Discussed healthy diet and exercise guidelines      She reports that she quit smoking about 47 years ago. She has never used smokeless tobacco.      Appropriate preventive services were discussed with this patient, including applicable screening as appropriate for cardiovascular disease, diabetes, osteopenia/osteoporosis, and glaucoma.  As appropriate for age/gender, discussed screening for colorectal cancer, prostate cancer, breast cancer, and cervical cancer. Checklist reviewing preventive services available has been given to the patient.    Reviewed patients plan of care and provided an AVS. The Basic Care Plan (routine screening as documented in Health Maintenance) for David meets the Care Plan requirement. This Care Plan has been established and reviewed with the Patient.          JAMES Jaquez [facility]    Identified Health Risks:  Answers for HPI/ROS submitted by the patient on 1/10/2023  If you checked off any problems, how difficult have these problems made it for you to do your work, take care of things at home, or get along with other people?: Not difficult at all  PHQ9 TOTAL SCORE: 0      "

## 2023-01-12 NOTE — RESULT ENCOUNTER NOTE
Hi     It was a pleasure meeting you!!!       - your cholesterol level has improved! Good job trying to limit saturated fats from your diet. According the AHA guidelines (see below) you do not need to start a statin medication. Please continue to work on a low saturated fat diet and try your hardest to get regular exercise in every-week. Recheck this lab in one year.   - The 10-year ASCVD risk score (Karthikeyan MARTINEZ, et al., 2019) is: 6.9%    Values used to calculate the score:      Age: 67 years      Sex: Female      Is Non- : No      Diabetic: No      Tobacco smoker: No      Systolic Blood Pressure: 116 mmHg      Is BP treated: No      HDL Cholesterol: 78 mg/dL      Total Cholesterol: 240 mg/dL    - your other labs appear stable. Good news!    If you have any questions, please let me know.     Nyla

## 2023-01-13 DIAGNOSIS — B00.9 HERPES SIMPLEX TYPE II INFECTION: ICD-10-CM

## 2023-01-16 RX ORDER — VALACYCLOVIR HYDROCHLORIDE 1 G/1
TABLET, FILM COATED ORAL
Qty: 24 TABLET | Refills: 5 | Status: SHIPPED | OUTPATIENT
Start: 2023-01-16 | End: 2024-04-18

## 2023-01-16 NOTE — TELEPHONE ENCOUNTER
"Last Written Prescription Date:  12/6/21  Last Fill Quantity: 24,  # refills: 5   Last office visit provider:  1/10/23     Routing refill request to provider for review/approval because:  Instructions do not say \"PRN;\" RN unable to renew as it looks like a potential break in medication since it was last prescribed.        Requested Prescriptions   Pending Prescriptions Disp Refills     valACYclovir (VALTREX) 1000 mg tablet 24 tablet 5     Sig: TAKE 1 TABLET(1000 MG) BY MOUTH TWICE DAILY FOR 3 DAYS       Antivirals for Herpes Protocol Passed - 1/13/2023 11:34 AM        Passed - Patient is age 12 or older        Passed - Recent (12 mo) or future (30 days) visit within the authorizing provider's specialty     Patient has had an office visit with the authorizing provider or a provider within the authorizing providers department within the previous 12 mos or has a future within next 30 days. See \"Patient Info\" tab in inbasket, or \"Choose Columns\" in Meds & Orders section of the refill encounter.              Passed - Medication is active on med list        Passed - Normal serum creatinine on file in past 12 months     Recent Labs   Lab Test 01/10/23  0815   CR 0.86       Ok to refill medication if creatinine is low               Annie Casas RN 01/16/23 10:57 AM  "

## 2023-01-18 DIAGNOSIS — F34.1 DYSTHYMIC DISORDER: ICD-10-CM

## 2023-01-19 RX ORDER — CITALOPRAM HYDROBROMIDE 20 MG/1
20 TABLET ORAL DAILY
Qty: 90 TABLET | Refills: 3 | OUTPATIENT
Start: 2023-01-19

## 2023-01-19 NOTE — TELEPHONE ENCOUNTER
This refill request is a duplicate request, previously received or sent.  Sent denial notification to pharmacy.  Lolis Padilla RN  [facility]

## 2023-01-23 ENCOUNTER — ANCILLARY PROCEDURE (OUTPATIENT)
Dept: BONE DENSITY | Facility: CLINIC | Age: 68
End: 2023-01-23
Attending: NURSE PRACTITIONER
Payer: COMMERCIAL

## 2023-01-23 DIAGNOSIS — Z78.0 POST-MENOPAUSE: ICD-10-CM

## 2023-01-23 DIAGNOSIS — M89.9 DISORDER OF BONE AND CARTILAGE: ICD-10-CM

## 2023-01-23 DIAGNOSIS — M94.9 DISORDER OF BONE AND CARTILAGE: ICD-10-CM

## 2023-01-23 PROCEDURE — 77080 DXA BONE DENSITY AXIAL: CPT | Mod: TC | Performed by: STUDENT IN AN ORGANIZED HEALTH CARE EDUCATION/TRAINING PROGRAM

## 2023-08-01 ENCOUNTER — TRANSFERRED RECORDS (OUTPATIENT)
Dept: HEALTH INFORMATION MANAGEMENT | Facility: CLINIC | Age: 68
End: 2023-08-01
Payer: COMMERCIAL

## 2023-09-22 ENCOUNTER — MYC MEDICAL ADVICE (OUTPATIENT)
Dept: FAMILY MEDICINE | Facility: CLINIC | Age: 68
End: 2023-09-22
Payer: COMMERCIAL

## 2023-09-25 ENCOUNTER — NURSE TRIAGE (OUTPATIENT)
Dept: FAMILY MEDICINE | Facility: CLINIC | Age: 68
End: 2023-09-25
Payer: COMMERCIAL

## 2023-09-25 NOTE — CONFIDENTIAL NOTE
Called pt and triaged.   Pt reported start of symptoms as 9/18/23 so she would be out of the range for the RN Paxlovid Protocol.   I informed the pt if her symptoms worsen to call [phone].   Pt agreed and stated her symptoms were not severe.     Delisa Gan RN  [facility]

## 2023-09-25 NOTE — TELEPHONE ENCOUNTER
Additional Information    Negative: SEVERE difficulty breathing (e.g., struggling for each breath, speaks in single words)    Negative: Difficult to awaken or acting confused (e.g., disoriented, slurred speech)    Negative: Bluish (or gray) lips or face now    Negative: Shock suspected (e.g., cold/pale/clammy skin, too weak to stand, low BP, rapid pulse)    Negative: Sounds like a life-threatening emergency to the triager    Negative: SEVERE or constant chest pain or pressure  (Exception: Mild central chest pain, present only when coughing.)    Negative: MODERATE difficulty breathing (e.g., speaks in phrases, SOB even at rest, pulse 100-120)    Negative: Headache and stiff neck (can't touch chin to chest)    Negative: Chest pain or pressure  (Exception: MILD central chest pain, present only when coughing.)    Negative: Drinking very little and dehydration suspected (e.g., no urine > 12 hours, very dry mouth, very lightheaded)    Negative: Patient sounds very sick or weak to the triager    Negative: Fever > 103 F (39.4 C)    Protocols used: Coronavirus (COVID-19) Diagnosed or [id number]

## 2023-09-26 ENCOUNTER — MYC MEDICAL ADVICE (OUTPATIENT)
Dept: FAMILY MEDICINE | Facility: CLINIC | Age: 68
End: 2023-09-26
Payer: COMMERCIAL

## 2023-11-06 ENCOUNTER — MYC MEDICAL ADVICE (OUTPATIENT)
Dept: FAMILY MEDICINE | Facility: CLINIC | Age: 68
End: 2023-11-06
Payer: COMMERCIAL

## 2023-11-09 ENCOUNTER — PATIENT OUTREACH (OUTPATIENT)
Dept: GASTROENTEROLOGY | Facility: CLINIC | Age: 68
End: 2023-11-09
Payer: COMMERCIAL

## 2024-01-15 DIAGNOSIS — F34.1 DYSTHYMIC DISORDER: ICD-10-CM

## 2024-01-16 RX ORDER — CITALOPRAM HYDROBROMIDE 20 MG/1
20 TABLET ORAL DAILY
Qty: 90 TABLET | Refills: 0 | Status: SHIPPED | OUTPATIENT
Start: 2024-01-16 | End: 2024-04-15

## 2024-02-05 ENCOUNTER — HOSPITAL ENCOUNTER (OUTPATIENT)
Dept: MAMMOGRAPHY | Facility: CLINIC | Age: 69
Discharge: HOME | End: 2024-02-05
Attending: NURSE PRACTITIONER | Admitting: NURSE PRACTITIONER
Payer: COMMERCIAL

## 2024-02-05 DIAGNOSIS — Z12.31 VISIT FOR SCREENING MAMMOGRAM: ICD-10-CM

## 2024-02-05 PROCEDURE — 77067 SCR MAMMO BI INCL CAD: CPT

## 2024-03-02 ENCOUNTER — HEALTH MAINTENANCE LETTER (OUTPATIENT)
Age: 69
End: 2024-03-02

## 2024-03-06 ASSESSMENT — PATIENT HEALTH QUESTIONNAIRE - PHQ9
SUM OF ALL RESPONSES TO PHQ QUESTIONS 1-9: 5
10. IF YOU CHECKED OFF ANY PROBLEMS, HOW DIFFICULT HAVE THESE PROBLEMS MADE IT FOR YOU TO DO YOUR WORK, TAKE CARE OF THINGS AT HOME, OR GET ALONG WITH OTHER PEOPLE: NOT DIFFICULT AT ALL
SUM OF ALL RESPONSES TO PHQ QUESTIONS 1-9: 5

## 2024-03-07 ENCOUNTER — OFFICE VISIT (OUTPATIENT)
Dept: INTERNAL MEDICINE | Facility: CLINIC | Age: 69
End: 2024-03-07
Payer: COMMERCIAL

## 2024-03-07 VITALS
OXYGEN SATURATION: 98 % | HEIGHT: 63 IN | WEIGHT: 156 LBS | RESPIRATION RATE: 16 BRPM | BODY MASS INDEX: 27.64 KG/M2 | TEMPERATURE: 98.1 F | SYSTOLIC BLOOD PRESSURE: 104 MMHG | DIASTOLIC BLOOD PRESSURE: 62 MMHG | HEART RATE: 61 BPM

## 2024-03-07 DIAGNOSIS — J20.9 ACUTE BRONCHITIS WITH SYMPTOMS > 10 DAYS: Primary | ICD-10-CM

## 2024-03-07 PROCEDURE — 99213 OFFICE O/P EST LOW 20 MIN: CPT | Performed by: INTERNAL MEDICINE

## 2024-03-07 RX ORDER — ALBUTEROL SULFATE 90 UG/1
2 AEROSOL, METERED RESPIRATORY (INHALATION) EVERY 6 HOURS PRN
Qty: 18 G | Refills: 1 | Status: SHIPPED | OUTPATIENT
Start: 2024-03-07 | End: 2024-05-01

## 2024-03-07 RX ORDER — AZITHROMYCIN 250 MG/1
TABLET, FILM COATED ORAL
Qty: 6 TABLET | Refills: 0 | Status: SHIPPED | OUTPATIENT
Start: 2024-03-07 | End: 2024-03-12

## 2024-03-07 ASSESSMENT — ENCOUNTER SYMPTOMS: COUGH: 1

## 2024-03-07 NOTE — PATIENT INSTRUCTIONS
Persisting upper respiratory infection symptoms of cough with a wheezing quality, nasal congestion, it is been going on for over 2 weeks, after returning from vacation in [place]     Considering the duration of symptoms, even though this probably started off as a virus, there could be a bacterial component, so will give Carri and azithromycin Z-Darrion antibiotic course, and also using albuterol inhaler to relieve any bronchospastic component of cough.  Use over-the-counter cough remedies such as guaifenesin with dextromethorphan (for example Robitussin DM)     The history is that Carri was on vacation in [place] with her , and he never got sick, but some of their friends that she was in close proximity to did get sick with similar symptoms.  Carri did run a home COVID test which was negative.     She is historically very healthy, takes no medications for blood pressure, cholesterol, or diabetes.  No history of lung disease, no history of asthma.     I reminded Carri that azithromycin is an antibacterial antibiotic, and will not treat viruses.  But because of duration of symptoms, there could be bacterial superinfection, and that is what the azithromycin is meant to treat.  I told her that the albuterol is a bronchodilator, relaxes the airways and might relieve some component of the cough that is caused by irritated airways.     I reminded her to stay well-hydrated, and I believe she will get better over the next week or so.

## 2024-03-07 NOTE — PROGRESS NOTES
"Mel Hernandez is a 68 year old, presenting for the following health issues:  Cough (For 3 weeks)      3/7/2024     8:52 AM   Additional Questions   Roomed by Joyce Pfeiffer    History of Present Illness       Reason for visit:  Cough, congestion  Symptom onset:  1-2 weeks ago  Symptoms include:  Cough congestion  Symptom intensity:  Moderate  Symptom progression:  Staying the same  Had these symptoms before:  No    She eats 2-3 servings of fruits and vegetables daily.She consumes 0 sweetened beverage(s) daily.She exercises with enough effort to increase her heart rate 9 or less minutes per day.  She exercises with enough effort to increase her heart rate 3 or less days per week.   She is taking medications regularly.      Objective    /62 (BP Location: Right arm, Patient Position: Sitting, Cuff Size: Adult Regular)   Pulse 61   Temp 98.1  F (36.7  C)   Resp 16   Ht 1.588 m (5' 2.5\")   Wt 70.8 kg (156 lb)   LMP  (LMP Unknown)   SpO2 98%   BMI 28.08 kg/m    Body mass index is 28.08 kg/m .    Physical Exam   On physical examination she is coughing and there is an end expiratory wheeze at the end of a vigorous cough.  However her lungs do sound clear.  Heart regular rate rhythm.  Abdomen nontender.  Oropharynx looks clear.  Tympanic membranes look normal.  Good oxygen saturation 98%.    ASSESSMENT PLAN    Acute illness visit    Persisting upper respiratory infection symptoms of cough with a wheezing quality, nasal congestion, it is been going on for over 2 weeks, after returning from vacation in [place]    Considering the duration of symptoms, even though this probably started off as a virus, there could be a bacterial component, so will give Carri and azithromycin Z-Darrion antibiotic course, and also using albuterol inhaler to relieve any bronchospastic component of cough.  Use over-the-counter cough remedies such as guaifenesin with dextromethorphan (for example Robitussin DM)    The history is " that Carri was on vacation in [place] with her , and he never got sick, but some of their friends that she was in close proximity to did get sick with similar symptoms.  Carri did run a home COVID test which was negative.    She is historically very healthy, takes no medications for blood pressure, cholesterol, or diabetes.  No history of lung disease, no history of asthma.    I reminded Carri that azithromycin is an antibacterial antibiotic, and will not treat viruses.  But because of duration of symptoms, there could be bacterial superinfection, and that is what the azithromycin is meant to treat.  I told her that the albuterol is a bronchodilator, relaxes the airways and might relieve some component of the cough that is caused by irritated airways.    I reminded her to stay well-hydrated, and I believe she will get better over the next week or so.    Signed Electronically by: ANIYA ORTIZ MD

## 2024-04-15 DIAGNOSIS — F34.1 DYSTHYMIC DISORDER: ICD-10-CM

## 2024-04-15 RX ORDER — CITALOPRAM HYDROBROMIDE 20 MG/1
20 TABLET ORAL DAILY
Qty: 90 TABLET | Refills: 0 | Status: SHIPPED | OUTPATIENT
Start: 2024-04-15 | End: 2024-07-15

## 2024-04-15 NOTE — TELEPHONE ENCOUNTER
Refill request for the following medication(s):  Pending Prescriptions:                       Disp   Refills    citalopram (CELEXA) 20 MG tablet          90 tab*0            Sig: Take 1 tablet (20 mg) by mouth daily      Pharmacy:    [facility] #[id number] - [place], [place] - [address]

## 2024-04-18 DIAGNOSIS — B00.9 HERPES SIMPLEX TYPE II INFECTION: ICD-10-CM

## 2024-04-18 RX ORDER — VALACYCLOVIR HYDROCHLORIDE 1 G/1
TABLET, FILM COATED ORAL
Qty: 24 TABLET | Refills: 0 | Status: SHIPPED | OUTPATIENT
Start: 2024-04-18 | End: 2024-06-12

## 2024-04-24 SDOH — HEALTH STABILITY: PHYSICAL HEALTH: ON AVERAGE, HOW MANY DAYS PER WEEK DO YOU ENGAGE IN MODERATE TO STRENUOUS EXERCISE (LIKE A BRISK WALK)?: 1 DAY

## 2024-04-24 SDOH — HEALTH STABILITY: PHYSICAL HEALTH: ON AVERAGE, HOW MANY MINUTES DO YOU ENGAGE IN EXERCISE AT THIS LEVEL?: 60 MIN

## 2024-04-24 ASSESSMENT — SOCIAL DETERMINANTS OF HEALTH (SDOH): HOW OFTEN DO YOU GET TOGETHER WITH FRIENDS OR RELATIVES?: PATIENT DECLINED

## 2024-04-29 ENCOUNTER — TRANSFERRED RECORDS (OUTPATIENT)
Dept: HEALTH INFORMATION MANAGEMENT | Facility: CLINIC | Age: 69
End: 2024-04-29
Payer: COMMERCIAL

## 2024-05-01 ENCOUNTER — OFFICE VISIT (OUTPATIENT)
Dept: INTERNAL MEDICINE | Facility: CLINIC | Age: 69
End: 2024-05-01
Payer: COMMERCIAL

## 2024-05-01 VITALS
BODY MASS INDEX: 28 KG/M2 | WEIGHT: 158 LBS | HEIGHT: 63 IN | OXYGEN SATURATION: 97 % | DIASTOLIC BLOOD PRESSURE: 58 MMHG | RESPIRATION RATE: 16 BRPM | TEMPERATURE: 98.4 F | HEART RATE: 71 BPM | SYSTOLIC BLOOD PRESSURE: 106 MMHG

## 2024-05-01 DIAGNOSIS — Z00.00 ROUTINE GENERAL MEDICAL EXAMINATION AT A HEALTH CARE FACILITY: Primary | ICD-10-CM

## 2024-05-01 DIAGNOSIS — E78.00 PURE HYPERCHOLESTEROLEMIA: ICD-10-CM

## 2024-05-01 DIAGNOSIS — M94.9 DISORDER OF BONE AND CARTILAGE: ICD-10-CM

## 2024-05-01 DIAGNOSIS — F34.1 DYSTHYMIC DISORDER: ICD-10-CM

## 2024-05-01 DIAGNOSIS — M89.9 DISORDER OF BONE AND CARTILAGE: ICD-10-CM

## 2024-05-01 DIAGNOSIS — E55.9 VITAMIN D DEFICIENCY: Chronic | ICD-10-CM

## 2024-05-01 PROCEDURE — 99214 OFFICE O/P EST MOD 30 MIN: CPT | Mod: 25 | Performed by: INTERNAL MEDICINE

## 2024-05-01 PROCEDURE — G0439 PPPS, SUBSEQ VISIT: HCPCS | Performed by: INTERNAL MEDICINE

## 2024-05-01 ASSESSMENT — PATIENT HEALTH QUESTIONNAIRE - PHQ9
10. IF YOU CHECKED OFF ANY PROBLEMS, HOW DIFFICULT HAVE THESE PROBLEMS MADE IT FOR YOU TO DO YOUR WORK, TAKE CARE OF THINGS AT HOME, OR GET ALONG WITH OTHER PEOPLE: NOT DIFFICULT AT ALL
SUM OF ALL RESPONSES TO PHQ QUESTIONS 1-9: 3
SUM OF ALL RESPONSES TO PHQ QUESTIONS 1-9: 3
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
## 2024-05-03 ENCOUNTER — LAB (OUTPATIENT)
Dept: LAB | Facility: CLINIC | Age: 69
End: 2024-05-03
Payer: COMMERCIAL

## 2024-05-03 DIAGNOSIS — E55.9 VITAMIN D DEFICIENCY: Chronic | ICD-10-CM

## 2024-05-03 DIAGNOSIS — Z00.00 ROUTINE GENERAL MEDICAL EXAMINATION AT A HEALTH CARE FACILITY: ICD-10-CM

## 2024-05-03 DIAGNOSIS — E78.00 PURE HYPERCHOLESTEROLEMIA: ICD-10-CM

## 2024-05-03 LAB
ERYTHROCYTE [DISTWIDTH] IN BLOOD BY AUTOMATED COUNT: 13.1 % (ref 10–15)
HBA1C MFR BLD: 5.6 % (ref 0–5.6)
HCT VFR BLD AUTO: 38.3 % (ref 35–47)
HGB BLD-MCNC: 12.5 G/DL (ref 11.7–15.7)
MCH RBC QN AUTO: 32.3 PG (ref 26.5–33)
MCHC RBC AUTO-ENTMCNC: 32.6 G/DL (ref 31.5–36.5)
MCV RBC AUTO: 99 FL (ref 78–100)
PLATELET # BLD AUTO: 361 10E3/UL (ref 150–450)
RBC # BLD AUTO: 3.87 10E6/UL (ref 3.8–5.2)
WBC # BLD AUTO: 7.2 10E3/UL (ref 4–11)

## 2024-05-03 PROCEDURE — 83036 HEMOGLOBIN GLYCOSYLATED A1C: CPT | Mod: GZ

## 2024-05-03 PROCEDURE — 85027 COMPLETE CBC AUTOMATED: CPT

## 2024-05-03 PROCEDURE — 82306 VITAMIN D 25 HYDROXY: CPT

## 2024-05-03 PROCEDURE — 80053 COMPREHEN METABOLIC PANEL: CPT

## 2024-05-03 PROCEDURE — 84443 ASSAY THYROID STIM HORMONE: CPT

## 2024-05-03 PROCEDURE — 36415 COLL VENOUS BLD VENIPUNCTURE: CPT

## 2024-05-03 PROCEDURE — 80061 LIPID PANEL: CPT

## 2024-05-04 LAB
ALBUMIN SERPL BCG-MCNC: 4.2 G/DL (ref 3.5–5.2)
ALP SERPL-CCNC: 79 U/L (ref 40–150)
ALT SERPL W P-5'-P-CCNC: 11 U/L (ref 0–50)
ANION GAP SERPL CALCULATED.3IONS-SCNC: 9 MMOL/L (ref 7–15)
AST SERPL W P-5'-P-CCNC: 22 U/L (ref 0–45)
BILIRUB SERPL-MCNC: 0.3 MG/DL
BUN SERPL-MCNC: 17.5 MG/DL (ref 8–23)
CALCIUM SERPL-MCNC: 9.5 MG/DL (ref 8.8–10.2)
CHLORIDE SERPL-SCNC: 106 MMOL/L (ref 98–107)
CHOLEST SERPL-MCNC: 245 MG/DL
CREAT SERPL-MCNC: 0.88 MG/DL (ref 0.51–0.95)
DEPRECATED HCO3 PLAS-SCNC: 27 MMOL/L (ref 22–29)
EGFRCR SERPLBLD CKD-EPI 2021: 70 ML/MIN/1.73M2
FASTING STATUS PATIENT QL REPORTED: YES
GLUCOSE SERPL-MCNC: 95 MG/DL (ref 70–99)
HDLC SERPL-MCNC: 77 MG/DL
LDLC SERPL CALC-MCNC: 148 MG/DL
NONHDLC SERPL-MCNC: 168 MG/DL
POTASSIUM SERPL-SCNC: 4.2 MMOL/L (ref 3.4–5.3)
PROT SERPL-MCNC: 6.7 G/DL (ref 6.4–8.3)
SODIUM SERPL-SCNC: 142 MMOL/L (ref 135–145)
TRIGL SERPL-MCNC: 102 MG/DL
TSH SERPL DL<=0.005 MIU/L-ACNC: 2.39 UIU/ML (ref 0.3–4.2)
VIT D+METAB SERPL-MCNC: 46 NG/ML (ref 20–50)

## 2024-06-12 DIAGNOSIS — B00.9 HERPES SIMPLEX TYPE II INFECTION: ICD-10-CM

## 2024-06-12 RX ORDER — VALACYCLOVIR HYDROCHLORIDE 1 G/1
TABLET, FILM COATED ORAL
Qty: 24 TABLET | Refills: 0 | Status: SHIPPED | OUTPATIENT
Start: 2024-06-12 | End: 2024-08-21

## 2024-06-12 NOTE — TELEPHONE ENCOUNTER
Pending Prescriptions:                       Disp   Refills    valACYclovir (VALTREX) 1000 mg tablet     24 tab*0            Sig: TAKE 1 TABLET(1000 MG) BY MOUTH TWICE DAILY FOR 3           DAYS    Pharmacy:    [facility] #[id number] [place] [address] AT [place] [address]

## 2024-07-15 DIAGNOSIS — F34.1 DYSTHYMIC DISORDER: ICD-10-CM

## 2024-07-15 RX ORDER — CITALOPRAM HYDROBROMIDE 20 MG/1
20 TABLET ORAL DAILY
Qty: 90 TABLET | Refills: 0 | Status: SHIPPED | OUTPATIENT
Start: 2024-07-15

## 2024-07-15 NOTE — TELEPHONE ENCOUNTER
Pending Prescriptions:                       Disp   Refills    citalopram (CELEXA) 20 MG tablet          90 tab*0            Sig: Take 1 tablet (20 mg) by mouth daily Needs OV    Pharmacy:    [facility] #[id number] - [place], [place] - [address]

## 2024-08-21 ENCOUNTER — MYC REFILL (OUTPATIENT)
Dept: FAMILY MEDICINE | Facility: CLINIC | Age: 69
End: 2024-08-21
Payer: COMMERCIAL

## 2024-08-21 DIAGNOSIS — B00.9 HERPES SIMPLEX TYPE II INFECTION: ICD-10-CM

## 2024-08-22 RX ORDER — VALACYCLOVIR HYDROCHLORIDE 1 G/1
TABLET, FILM COATED ORAL
Qty: 24 TABLET | Refills: 2 | Status: SHIPPED | OUTPATIENT
Start: 2024-08-22

## 2024-10-07 NOTE — LETTER
Letter by Tawana Spear MD at      Author: Tawana Spear MD Service: -- Author Type: --    Filed:  Encounter Date: 2/18/2020 Status: (Other)         David Nugent  [address] [place] [address]                 February 18, 2020       Dear MsKay Raffi,    Our record indicates that you're due for an Annual Wellness Exam. Please call our office at [phone] to schedule that appointment.      Please call with questions or contact us using [employer].      Sincerely,        Electronically signed by Tawana Spear MD            Patient calling to request refill of DULoxetine 30 MG Oral Cap DR Particles AND gabapentin 100 MG Oral Cap   Pharmacy [facility] #[id number] - [place], [place] - [address] AT [facility] [address] [phone], [phone]     Patient informed of 48 hour refill policy excluding weekends and holidays.   Informed patient prescription is sent directly to pharmacy.    Further explained patient will not receive a call back once prescription is ready.

## 2024-10-19 ENCOUNTER — MYC REFILL (OUTPATIENT)
Dept: FAMILY MEDICINE | Facility: CLINIC | Age: 69
End: 2024-10-19
Payer: COMMERCIAL

## 2024-10-19 DIAGNOSIS — F34.1 DYSTHYMIC DISORDER: ICD-10-CM

## 2024-10-22 RX ORDER — CITALOPRAM HYDROBROMIDE 20 MG/1
20 TABLET ORAL DAILY
Qty: 90 TABLET | Refills: 0 | Status: SHIPPED | OUTPATIENT
Start: 2024-10-22

## 2025-01-14 ENCOUNTER — MYC REFILL (OUTPATIENT)
Dept: FAMILY MEDICINE | Facility: CLINIC | Age: 70
End: 2025-01-14
Payer: COMMERCIAL

## 2025-01-14 DIAGNOSIS — F34.1 DYSTHYMIC DISORDER: ICD-10-CM

## 2025-01-15 RX ORDER — CITALOPRAM HYDROBROMIDE 20 MG/1
20 TABLET ORAL DAILY
Qty: 90 TABLET | Refills: 0 | Status: SHIPPED | OUTPATIENT
Start: 2025-01-15

## 2025-03-09 ENCOUNTER — MYC REFILL (OUTPATIENT)
Dept: FAMILY MEDICINE | Facility: CLINIC | Age: 70
End: 2025-03-09
Payer: COMMERCIAL

## 2025-03-09 DIAGNOSIS — B00.9 HERPES SIMPLEX TYPE II INFECTION: ICD-10-CM

## 2025-03-10 RX ORDER — VALACYCLOVIR HYDROCHLORIDE 1 G/1
TABLET, FILM COATED ORAL
Qty: 24 TABLET | Refills: 0 | Status: SHIPPED | OUTPATIENT
Start: 2025-03-10

## 2025-03-10 RX ORDER — VALACYCLOVIR HYDROCHLORIDE 1 G/1
TABLET, FILM COATED ORAL
Qty: 24 TABLET | Refills: 2 | OUTPATIENT
Start: 2025-03-10

## 2025-03-15 ENCOUNTER — HEALTH MAINTENANCE LETTER (OUTPATIENT)
Age: 70
End: 2025-03-15

## 2025-04-01 ENCOUNTER — PATIENT OUTREACH (OUTPATIENT)
Dept: CARE COORDINATION | Facility: CLINIC | Age: 70
End: 2025-04-01
Payer: COMMERCIAL

## 2025-04-17 ENCOUNTER — HOSPITAL ENCOUNTER (OUTPATIENT)
Dept: MAMMOGRAPHY | Facility: CLINIC | Age: 70
Discharge: HOME | End: 2025-04-17
Attending: NURSE PRACTITIONER
Payer: COMMERCIAL

## 2025-04-17 DIAGNOSIS — Z12.31 VISIT FOR SCREENING MAMMOGRAM: ICD-10-CM

## 2025-04-17 PROCEDURE — 77063 BREAST TOMOSYNTHESIS BI: CPT

## 2025-04-17 PROCEDURE — 77067 SCR MAMMO BI INCL CAD: CPT

## 2025-06-04 DIAGNOSIS — B00.9 HERPES SIMPLEX TYPE II INFECTION: ICD-10-CM

## 2025-06-04 RX ORDER — VALACYCLOVIR HYDROCHLORIDE 1 G/1
1000 TABLET, FILM COATED ORAL
Qty: 24 TABLET | Refills: 1 | Status: SHIPPED | OUTPATIENT
Start: 2025-06-04 | End: 2025-06-07

## 2025-08-28 DIAGNOSIS — F34.1 DYSTHYMIC DISORDER: ICD-10-CM

## 2025-08-28 RX ORDER — CITALOPRAM HYDROBROMIDE 20 MG/1
20 TABLET ORAL DAILY
Qty: 90 TABLET | Refills: 2 | Status: SHIPPED | OUTPATIENT
Start: 2025-08-28